# Patient Record
Sex: FEMALE | Race: WHITE | URBAN - METROPOLITAN AREA
[De-identification: names, ages, dates, MRNs, and addresses within clinical notes are randomized per-mention and may not be internally consistent; named-entity substitution may affect disease eponyms.]

---

## 2017-08-10 ENCOUNTER — OUTPATIENT (OUTPATIENT)
Dept: OUTPATIENT SERVICES | Facility: HOSPITAL | Age: 32
LOS: 1 days | End: 2017-08-10
Payer: OTHER GOVERNMENT

## 2017-08-10 PROCEDURE — 76801 OB US < 14 WKS SINGLE FETUS: CPT | Mod: 26

## 2017-09-22 ENCOUNTER — EMERGENCY (EMERGENCY)
Facility: HOSPITAL | Age: 32
LOS: 1 days | End: 2017-09-22
Payer: OTHER GOVERNMENT

## 2017-09-22 PROCEDURE — 99285 EMERGENCY DEPT VISIT HI MDM: CPT

## 2017-09-22 PROCEDURE — 76815 OB US LIMITED FETUS(S): CPT | Mod: 26

## 2018-02-04 ENCOUNTER — INPATIENT (INPATIENT)
Facility: HOSPITAL | Age: 33
LOS: 2 days | Discharge: HOME | End: 2018-02-07
Attending: OBSTETRICS & GYNECOLOGY | Admitting: OBSTETRICS & GYNECOLOGY
Payer: MEDICAID

## 2018-02-04 VITALS
DIASTOLIC BLOOD PRESSURE: 77 MMHG | TEMPERATURE: 98 F | SYSTOLIC BLOOD PRESSURE: 135 MMHG | RESPIRATION RATE: 20 BRPM | HEART RATE: 77 BPM | OXYGEN SATURATION: 100 %

## 2018-02-04 DIAGNOSIS — O47.1 FALSE LABOR AT OR AFTER 37 COMPLETED WEEKS OF GESTATION: ICD-10-CM

## 2018-02-04 LAB
ABO RH CONFIRMATION: SIGNIFICANT CHANGE UP
AMPHET UR-MCNC: NEGATIVE — SIGNIFICANT CHANGE UP
APPEARANCE UR: ABNORMAL
BACTERIA # UR AUTO: ABNORMAL
BARBITURATES UR SCN-MCNC: NEGATIVE — SIGNIFICANT CHANGE UP
BASE EXCESS BLDCOV CALC-SCNC: -5.8 MMOL/L — LOW (ref -2–2)
BASOPHILS # BLD AUTO: 0 K/UL — SIGNIFICANT CHANGE UP (ref 0–0.2)
BENZODIAZ UR-MCNC: NEGATIVE — SIGNIFICANT CHANGE UP
BILIRUB UR-MCNC: NEGATIVE — SIGNIFICANT CHANGE UP
BLD GP AB SCN SERPL QL: SIGNIFICANT CHANGE UP
COCAINE METAB.OTHER UR-MCNC: POSITIVE
COLOR SPEC: YELLOW — SIGNIFICANT CHANGE UP
DIFF PNL FLD: ABNORMAL
EOSINOPHIL # BLD AUTO: 0 K/UL — SIGNIFICANT CHANGE UP (ref 0–0.5)
EPI CELLS # UR: SIGNIFICANT CHANGE UP
GAS PNL BLDCOV: 7.29 — SIGNIFICANT CHANGE UP (ref 7.25–7.45)
GLUCOSE UR QL: 50 MG/DL
HBV SURFACE AG SERPL QL IA: SIGNIFICANT CHANGE UP
HCO3 BLDCOV-SCNC: 19 MMOL/L — LOW (ref 21–29)
HCT VFR BLD CALC: 38.3 % — SIGNIFICANT CHANGE UP (ref 37–47)
HGB BLD-MCNC: 13.5 G/DL — SIGNIFICANT CHANGE UP (ref 12–16)
HIV 1 & 2 AB SERPL IA.RAPID: SIGNIFICANT CHANGE UP
KETONES UR-MCNC: ABNORMAL
LEUKOCYTE ESTERASE UR-ACNC: ABNORMAL
LYMPHOCYTES # BLD AUTO: 1 K/UL — SIGNIFICANT CHANGE UP (ref 1–4.8)
LYMPHOCYTES # BLD AUTO: 3 % — LOW (ref 20–55)
MCHC RBC-ENTMCNC: 31.3 PG — HIGH (ref 27–31)
MCHC RBC-ENTMCNC: 35.2 G/DL — SIGNIFICANT CHANGE UP (ref 32–36)
MCV RBC AUTO: 88.9 FL — SIGNIFICANT CHANGE UP (ref 81–99)
METHADONE UR-MCNC: NEGATIVE — SIGNIFICANT CHANGE UP
MONOCYTES # BLD AUTO: 0.9 K/UL — HIGH (ref 0–0.8)
MONOCYTES NFR BLD AUTO: 2 % — LOW (ref 3–10)
NEUTROPHILS # BLD AUTO: 35 K/UL — HIGH (ref 1.8–8)
NEUTROPHILS NFR BLD AUTO: 80 % — HIGH (ref 37–73)
NEUTS BAND # BLD: 15 % — HIGH (ref 0–8)
NITRITE UR-MCNC: POSITIVE
OPIATES UR-MCNC: POSITIVE
PCO2 BLDCOV: 43.1 MMHG — SIGNIFICANT CHANGE UP (ref 29–53)
PCP SPEC-MCNC: SIGNIFICANT CHANGE UP
PCP UR-MCNC: NEGATIVE — SIGNIFICANT CHANGE UP
PH UR: 6 — SIGNIFICANT CHANGE UP (ref 5–8)
PLAT MORPH BLD: NORMAL — SIGNIFICANT CHANGE UP
PLATELET # BLD AUTO: 295 K/UL — SIGNIFICANT CHANGE UP (ref 150–400)
PO2 BLDCOA: 25.1 MMHG — SIGNIFICANT CHANGE UP (ref 17–41)
PROT UR-MCNC: 100 MG/DL
RBC # BLD: 4.31 M/UL — LOW (ref 4.4–5.2)
RBC # FLD: 13.6 % — SIGNIFICANT CHANGE UP (ref 11–15.6)
RBC BLD AUTO: NORMAL — SIGNIFICANT CHANGE UP
RBC CASTS # UR COMP ASSIST: ABNORMAL /HPF (ref 0–4)
SAO2 % BLDCOV: SIGNIFICANT CHANGE UP
SP GR SPEC: 1.02 — SIGNIFICANT CHANGE UP (ref 1.01–1.02)
THC UR QL: POSITIVE
TYPE + AB SCN PNL BLD: SIGNIFICANT CHANGE UP
UROBILINOGEN FLD QL: 1 MG/DL
WBC # BLD: 37 K/UL — HIGH (ref 4.8–10.8)
WBC # FLD AUTO: 37 K/UL — HIGH (ref 4.8–10.8)
WBC UR QL: >50

## 2018-02-04 PROCEDURE — 88307 TISSUE EXAM BY PATHOLOGIST: CPT | Mod: 26

## 2018-02-04 RX ORDER — FERROUS SULFATE 325(65) MG
325 TABLET ORAL DAILY
Refills: 0 | Status: DISCONTINUED | OUTPATIENT
Start: 2018-02-04 | End: 2018-02-07

## 2018-02-04 RX ORDER — KETOROLAC TROMETHAMINE 30 MG/ML
30 SYRINGE (ML) INJECTION ONCE
Refills: 0 | Status: DISCONTINUED | OUTPATIENT
Start: 2018-02-04 | End: 2018-02-04

## 2018-02-04 RX ORDER — SODIUM CHLORIDE 9 MG/ML
1000 INJECTION, SOLUTION INTRAVENOUS
Refills: 0 | Status: DISCONTINUED | OUTPATIENT
Start: 2018-02-04 | End: 2018-02-07

## 2018-02-04 RX ORDER — OXYTOCIN 10 UNIT/ML
333.33 VIAL (ML) INJECTION
Qty: 20 | Refills: 0 | Status: COMPLETED | OUTPATIENT
Start: 2018-02-04 | End: 2018-02-04

## 2018-02-04 RX ORDER — CITRIC ACID/SODIUM CITRATE 300-500 MG
30 SOLUTION, ORAL ORAL ONCE
Refills: 0 | Status: DISCONTINUED | OUTPATIENT
Start: 2018-02-04 | End: 2018-02-07

## 2018-02-04 RX ORDER — ONDANSETRON 8 MG/1
4 TABLET, FILM COATED ORAL ONCE
Refills: 0 | Status: DISCONTINUED | OUTPATIENT
Start: 2018-02-04 | End: 2018-02-04

## 2018-02-04 RX ORDER — AMPICILLIN TRIHYDRATE 250 MG
CAPSULE ORAL
Refills: 0 | Status: DISCONTINUED | OUTPATIENT
Start: 2018-02-04 | End: 2018-02-07

## 2018-02-04 RX ORDER — TETANUS TOXOID, REDUCED DIPHTHERIA TOXOID AND ACELLULAR PERTUSSIS VACCINE, ADSORBED 5; 2.5; 8; 8; 2.5 [IU]/.5ML; [IU]/.5ML; UG/.5ML; UG/.5ML; UG/.5ML
0.5 SUSPENSION INTRAMUSCULAR ONCE
Refills: 0 | Status: DISCONTINUED | OUTPATIENT
Start: 2018-02-04 | End: 2018-02-07

## 2018-02-04 RX ORDER — AMPICILLIN TRIHYDRATE 250 MG
2 CAPSULE ORAL ONCE
Refills: 0 | Status: COMPLETED | OUTPATIENT
Start: 2018-02-04 | End: 2018-02-04

## 2018-02-04 RX ORDER — OXYTOCIN 10 UNIT/ML
41.67 VIAL (ML) INJECTION
Qty: 20 | Refills: 0 | Status: DISCONTINUED | OUTPATIENT
Start: 2018-02-04 | End: 2018-02-04

## 2018-02-04 RX ORDER — CEFAZOLIN SODIUM 1 G
2000 VIAL (EA) INJECTION ONCE
Refills: 0 | Status: COMPLETED | OUTPATIENT
Start: 2018-02-04 | End: 2018-02-04

## 2018-02-04 RX ORDER — SODIUM CHLORIDE 9 MG/ML
1000 INJECTION, SOLUTION INTRAVENOUS ONCE
Refills: 0 | Status: COMPLETED | OUTPATIENT
Start: 2018-02-04 | End: 2018-02-04

## 2018-02-04 RX ORDER — LANOLIN
1 OINTMENT (GRAM) TOPICAL
Refills: 0 | Status: DISCONTINUED | OUTPATIENT
Start: 2018-02-04 | End: 2018-02-07

## 2018-02-04 RX ORDER — SODIUM CHLORIDE 9 MG/ML
1000 INJECTION, SOLUTION INTRAVENOUS ONCE
Refills: 0 | Status: DISCONTINUED | OUTPATIENT
Start: 2018-02-04 | End: 2018-02-07

## 2018-02-04 RX ORDER — DOCUSATE SODIUM 100 MG
100 CAPSULE ORAL
Refills: 0 | Status: DISCONTINUED | OUTPATIENT
Start: 2018-02-04 | End: 2018-02-07

## 2018-02-04 RX ORDER — HYDROMORPHONE HYDROCHLORIDE 2 MG/ML
30 INJECTION INTRAMUSCULAR; INTRAVENOUS; SUBCUTANEOUS
Refills: 0 | Status: DISCONTINUED | OUTPATIENT
Start: 2018-02-04 | End: 2018-02-05

## 2018-02-04 RX ORDER — LABETALOL HCL 100 MG
20 TABLET ORAL ONCE
Refills: 0 | Status: COMPLETED | OUTPATIENT
Start: 2018-02-04 | End: 2018-02-04

## 2018-02-04 RX ORDER — SODIUM CHLORIDE 9 MG/ML
1000 INJECTION, SOLUTION INTRAVENOUS
Refills: 0 | Status: DISCONTINUED | OUTPATIENT
Start: 2018-02-04 | End: 2018-02-04

## 2018-02-04 RX ORDER — SIMETHICONE 80 MG/1
80 TABLET, CHEWABLE ORAL EVERY 4 HOURS
Refills: 0 | Status: DISCONTINUED | OUTPATIENT
Start: 2018-02-04 | End: 2018-02-07

## 2018-02-04 RX ORDER — OXYCODONE AND ACETAMINOPHEN 5; 325 MG/1; MG/1
1 TABLET ORAL
Refills: 0 | Status: DISCONTINUED | OUTPATIENT
Start: 2018-02-04 | End: 2018-02-05

## 2018-02-04 RX ORDER — GLYCERIN ADULT
1 SUPPOSITORY, RECTAL RECTAL AT BEDTIME
Refills: 0 | Status: DISCONTINUED | OUTPATIENT
Start: 2018-02-04 | End: 2018-02-07

## 2018-02-04 RX ORDER — METOCLOPRAMIDE HCL 10 MG
10 TABLET ORAL ONCE
Refills: 0 | Status: DISCONTINUED | OUTPATIENT
Start: 2018-02-04 | End: 2018-02-07

## 2018-02-04 RX ORDER — ACETAMINOPHEN 500 MG
650 TABLET ORAL EVERY 6 HOURS
Refills: 0 | Status: DISCONTINUED | OUTPATIENT
Start: 2018-02-04 | End: 2018-02-07

## 2018-02-04 RX ORDER — OXYTOCIN 10 UNIT/ML
41.67 VIAL (ML) INJECTION
Qty: 20 | Refills: 0 | Status: DISCONTINUED | OUTPATIENT
Start: 2018-02-04 | End: 2018-02-07

## 2018-02-04 RX ORDER — DIPHENHYDRAMINE HCL 50 MG
25 CAPSULE ORAL EVERY 6 HOURS
Refills: 0 | Status: DISCONTINUED | OUTPATIENT
Start: 2018-02-04 | End: 2018-02-07

## 2018-02-04 RX ORDER — AMPICILLIN TRIHYDRATE 250 MG
2 CAPSULE ORAL EVERY 6 HOURS
Refills: 0 | Status: DISCONTINUED | OUTPATIENT
Start: 2018-02-05 | End: 2018-02-07

## 2018-02-04 RX ORDER — OXYCODONE AND ACETAMINOPHEN 5; 325 MG/1; MG/1
2 TABLET ORAL EVERY 6 HOURS
Refills: 0 | Status: DISCONTINUED | OUTPATIENT
Start: 2018-02-04 | End: 2018-02-05

## 2018-02-04 RX ADMIN — Medication 100 MILLIGRAM(S): at 16:00

## 2018-02-04 RX ADMIN — Medication 216 GRAM(S): at 22:00

## 2018-02-04 RX ADMIN — Medication 125 MILLIUNIT(S)/MIN: at 19:46

## 2018-02-04 RX ADMIN — Medication 1000 MILLIUNIT(S)/MIN: at 19:43

## 2018-02-04 RX ADMIN — SODIUM CHLORIDE 2000 MILLILITER(S): 9 INJECTION, SOLUTION INTRAVENOUS at 19:25

## 2018-02-04 RX ADMIN — Medication 20 MILLIGRAM(S): at 18:02

## 2018-02-04 NOTE — CHART NOTE - NSCHARTNOTEFT_GEN_A_CORE
Hola 18:00phone call from OBGyn ([place]oda Gama) to inform worker about pt's arrival condition . Pt was found by her partner on the floor lethargic as per pt's boyfriend first trimester of pregnancy,as per EMS first pregnancy for pt. Pt end up having a Csection due to fetal distress ,as per baby's appearance around 34weeks gestational stage. Baby in the NICU, in withdrawals. Pt's lab results positive for cocaine,opioids.   21:20 Worker met with pt however, pt nonverbal at this moment. Worker spoke with pt's nurse( Sandy) states that pt did not talk to her either. Pt does not have an ID on her ,as per EMS her name is Molly Penn from [facility], unable to verify her personal info. Worker can not proceed with CPS referral until reliable info is obtained. SW to follow in the am. oHla 18:00phone call from OBGyn (Rhoda Gama) to inform worker about pt's arrival condition . Pt was found by her partner on the floor lethargic as per pt's boyfriend first trimester of pregnancy, as per EMS first pregnancy for pt. Pt end up having a Csection due to fetal distress ,as per baby's appearance around 34weeks gestational stage. Baby in the NICU, in withdrawals. Pt's lab results positive for cocaine, opiates and marihuana.   21:20 Worker met with pt however, pt nonverbal at this moment. Worker spoke with pt's nurse (Sandy) states that pt did not talk to her either. Pt does not have an ID on her ,as per EMS her name is Molly Penn from [facility], unable to verify her personal info. Worker can not proceed with CPS referral until reliable info is obtained. SW to follow in the am.

## 2018-02-05 DIAGNOSIS — R26.9 UNSPECIFIED ABNORMALITIES OF GAIT AND MOBILITY: ICD-10-CM

## 2018-02-05 DIAGNOSIS — R10.2 PELVIC AND PERINEAL PAIN: ICD-10-CM

## 2018-02-05 DIAGNOSIS — M62.81 MUSCLE WEAKNESS (GENERALIZED): ICD-10-CM

## 2018-02-05 DIAGNOSIS — F19.90 OTHER PSYCHOACTIVE SUBSTANCE USE, UNSPECIFIED, UNCOMPLICATED: ICD-10-CM

## 2018-02-05 DIAGNOSIS — F19.10 OTHER PSYCHOACTIVE SUBSTANCE ABUSE, UNCOMPLICATED: ICD-10-CM

## 2018-02-05 LAB
BASOPHILS # BLD AUTO: 0 K/UL — SIGNIFICANT CHANGE UP (ref 0–0.2)
BASOPHILS NFR BLD AUTO: 0.1 % — SIGNIFICANT CHANGE UP (ref 0–2)
EOSINOPHIL # BLD AUTO: 0 K/UL — SIGNIFICANT CHANGE UP (ref 0–0.5)
EOSINOPHIL NFR BLD AUTO: 0 % — SIGNIFICANT CHANGE UP (ref 0–5)
HCT VFR BLD CALC: 28.1 % — LOW (ref 37–47)
HGB BLD-MCNC: 9.4 G/DL — LOW (ref 12–16)
LYMPHOCYTES # BLD AUTO: 1.5 K/UL — SIGNIFICANT CHANGE UP (ref 1–4.8)
LYMPHOCYTES # BLD AUTO: 6.3 % — LOW (ref 20–55)
MCHC RBC-ENTMCNC: 30.6 PG — SIGNIFICANT CHANGE UP (ref 27–31)
MCHC RBC-ENTMCNC: 33.5 G/DL — SIGNIFICANT CHANGE UP (ref 32–36)
MCV RBC AUTO: 91.5 FL — SIGNIFICANT CHANGE UP (ref 81–99)
MONOCYTES # BLD AUTO: 1.1 K/UL — HIGH (ref 0–0.8)
MONOCYTES NFR BLD AUTO: 4.6 % — SIGNIFICANT CHANGE UP (ref 3–10)
NEUTROPHILS # BLD AUTO: 21.4 K/UL — HIGH (ref 1.8–8)
NEUTROPHILS NFR BLD AUTO: 88.6 % — HIGH (ref 37–73)
PLATELET # BLD AUTO: 261 K/UL — SIGNIFICANT CHANGE UP (ref 150–400)
RBC # BLD: 3.07 M/UL — LOW (ref 4.4–5.2)
RBC # FLD: 13.8 % — SIGNIFICANT CHANGE UP (ref 11–15.6)
RUBV IGG SER-ACNC: 16.2 INDEX — SIGNIFICANT CHANGE UP
RUBV IGG SER-IMP: POSITIVE — SIGNIFICANT CHANGE UP
T PALLIDUM AB TITR SER: NEGATIVE — SIGNIFICANT CHANGE UP
WBC # BLD: 24.2 K/UL — HIGH (ref 4.8–10.8)
WBC # FLD AUTO: 24.2 K/UL — HIGH (ref 4.8–10.8)

## 2018-02-05 RX ORDER — ACETAMINOPHEN 500 MG
975 TABLET ORAL EVERY 8 HOURS
Refills: 0 | Status: DISCONTINUED | OUTPATIENT
Start: 2018-02-05 | End: 2018-02-07

## 2018-02-05 RX ORDER — CYCLOBENZAPRINE HYDROCHLORIDE 10 MG/1
5 TABLET, FILM COATED ORAL THREE TIMES A DAY
Refills: 0 | Status: DISCONTINUED | OUTPATIENT
Start: 2018-02-05 | End: 2018-02-07

## 2018-02-05 RX ORDER — IBUPROFEN 200 MG
600 TABLET ORAL
Refills: 0 | Status: DISCONTINUED | OUTPATIENT
Start: 2018-02-05 | End: 2018-02-07

## 2018-02-05 RX ADMIN — HYDROMORPHONE HYDROCHLORIDE 30 MILLILITER(S): 2 INJECTION INTRAMUSCULAR; INTRAVENOUS; SUBCUTANEOUS at 07:13

## 2018-02-05 RX ADMIN — Medication 975 MILLIGRAM(S): at 14:19

## 2018-02-05 RX ADMIN — Medication 600 MILLIGRAM(S): at 11:35

## 2018-02-05 RX ADMIN — Medication 600 MILLIGRAM(S): at 18:45

## 2018-02-05 RX ADMIN — Medication 216 GRAM(S): at 09:34

## 2018-02-05 RX ADMIN — CYCLOBENZAPRINE HYDROCHLORIDE 5 MILLIGRAM(S): 10 TABLET, FILM COATED ORAL at 14:20

## 2018-02-05 RX ADMIN — Medication 600 MILLIGRAM(S): at 12:30

## 2018-02-05 RX ADMIN — Medication 975 MILLIGRAM(S): at 22:12

## 2018-02-05 RX ADMIN — Medication 1 TABLET(S): at 14:18

## 2018-02-05 RX ADMIN — Medication 975 MILLIGRAM(S): at 15:00

## 2018-02-05 RX ADMIN — Medication 600 MILLIGRAM(S): at 17:48

## 2018-02-05 RX ADMIN — Medication 216 GRAM(S): at 22:14

## 2018-02-05 RX ADMIN — Medication 325 MILLIGRAM(S): at 14:18

## 2018-02-05 RX ADMIN — CYCLOBENZAPRINE HYDROCHLORIDE 5 MILLIGRAM(S): 10 TABLET, FILM COATED ORAL at 22:13

## 2018-02-05 RX ADMIN — SODIUM CHLORIDE 125 MILLILITER(S): 9 INJECTION, SOLUTION INTRAVENOUS at 17:20

## 2018-02-05 RX ADMIN — SODIUM CHLORIDE 125 MILLILITER(S): 9 INJECTION, SOLUTION INTRAVENOUS at 09:11

## 2018-02-05 RX ADMIN — Medication 216 GRAM(S): at 17:19

## 2018-02-05 RX ADMIN — Medication 216 GRAM(S): at 04:10

## 2018-02-05 RX ADMIN — Medication 975 MILLIGRAM(S): at 23:00

## 2018-02-05 NOTE — DISCHARGE NOTE OB - CARE PROVIDER_API CALL
[facility],   [address] [place] [address]  [phone]  Phone: [phone]  Fax: (       -

## 2018-02-05 NOTE — DISCHARGE NOTE OB - PATIENT PORTAL LINK FT
You can access the [facility] Patient Portal, offered by [facility], by registering with the following website: [email or web address]

## 2018-02-05 NOTE — DISCHARGE NOTE OB - MATERIALS PROVIDED
[facility] [place] Screening Program/Bottle Feeding Log/Guide to Postpartum Care/[facility] Hearing Screen Program/Back To Sleep Handout/Shaken Baby Prevention Handout/Birth Certificate Instructions/Tdap Vaccination (VIS Pub Date: 2012)

## 2018-02-05 NOTE — DISCHARGE NOTE OB - PLAN OF CARE
Please use your referral and make an appointment for [facility] at [place] for mental health and substance use counseling. Follow up in 5 days at your obstetrician (or the OB listed below) for incision evaluation (+staple removal), and to follow up with the obstetrician in 6 weeks for postpartum care. Delivered

## 2018-02-05 NOTE — CONSULT NOTE ADULT - ATTENDING COMMENTS
PLAN-  #Controlled Substances:  - recommend d/c Dilaudid IV PCA  - recommend d/c Percocet   - Please monitor closely for oversedation and respiratory depression     #Adjuvant Analgesics:  - recommend starting acetaminophen 975mg PO q8h standing if LFTs are WNL  - recommend starting Ibuprofen 600mg PO q6h standing if renal function WNL  - recommend starting flexeril 5mg PO TID x5 days     #Bowel Regimen:  - per primary team as clinically indicated       Advise caution if primary team elects to continue with opioid analgesia given evidence of cocaine in recent UTox.   Recommend social work consultation for consideration of inpatient vs outpatient drug rehab/detox.   Please page if any concerns: [phone].

## 2018-02-05 NOTE — DISCHARGE NOTE OB - COMMUNITY RESOURCES
D/C plan is home. [place] to remain in NBSN until medically stable.  Community, Maternity and substance Abuse Resource lists. [facility] appt made for 2108 @ 11:30 with elisabeth Kennedy. Address [address] [place] [address] [phone]. N to follow  upon D/C.

## 2018-02-05 NOTE — DISCHARGE NOTE OB - CARE PLAN
Principal Discharge DX:	 delivery delivered  Goal:	Delivered  Assessment and plan of treatment:	Follow up in 5 days at your obstetrician (or the OB listed below) for incision evaluation (+staple removal), and to follow up with the obstetrician in 6 weeks for postpartum care.  Secondary Diagnosis:	Substance abuse  Assessment and plan of treatment:	Please use your referral and make an appointment for [facility] at [place] for mental health and substance use counseling.

## 2018-02-05 NOTE — PROGRESS NOTE ADULT - ASSESSMENT
32y  s/p emergent  due to fetal distress at unknown gestational age PPD#1. Patient is poor historian with history of heavy drug abuse. Utox positive for cocaine, opiates and THC. Patient is currently on PCA pump for pain control. Pain management consult pending for pain control recommendations given hx of opiate abuse. Social work consult pending.

## 2018-02-05 NOTE — CONSULT NOTE ADULT - SUBJECTIVE AND OBJECTIVE BOX
Chief Complaint: postoperative pelvic pain       HPI:   CHELSEA KEY is a 32y Female that was seen and examined at bedside laying comfortably supine and noted to be sedated. Patient with difficulty keeping eyes open throughout encounter but noted to report 10/10 pain localized to the pelvic region. Patient noted to be poor historian and denies any past history of smoking, alcohol, or illicit drug use, which remains contrary to obtained UTox results + for THC, opioids, cocaine. Nursing reports patient has not taken anything PO as of yet. Nursing reports patient was able to ambulate to bathroom with moderate assistance. Noted 6/6 bolusing over last 4hr documented period. Patient offers no further complaints.       REVIEW OF SYSTEMS: X denotes positive finding, otherwise all additional items were reviewed and negative  GEN: [] fever, [] chills, [] change in appetite, [] weight loss, [] fatigue, [x] sedation  ENT: [] change in vision, [] dry mouth, [] ringing in ears, [] sore throat  RESP: [] shortness of breath, [] Obstructive sleep apnea  CV: [] chest pain, [] palpitations   GI: [] nausea, [] vomiting, [] flatus, [] recent BM, [] constipation, [] GERD  : [] bladder dysfunction, [] dysuria  MSK: [] weakness, [] joint pain, [] myalgias  Neuro: [x] pain, [] numbness, [] tingling, [] tremor, [] seizures  Skin: [] rash, [] pruritis  Psych: [] anxiety, [] depression  Endo: [] polydipsia  Heme: [] coagulopathic disorder      Home Medications:  none provided    PAST MEDICAL & SURGICAL HISTORY:  +illicit drug use   s/p     SOCIAL HISTORY:  Denies Smoking, Alcohol, or Drug Use  FAMILY HISTORY:  unable to obtain       Allergies  Allergy Status Unknown      PAIN MEDICATIONS:  MEDICATIONS  (STANDING):  ampicillin  IVPB      ampicillin  IVPB 2 Gram(s) IV Intermittent every 6 hours  citric acid/sodium citrate Solution 30 milliLiter(s) Oral once  diphtheria/tetanus/pertussis (acellular) Vaccine (ADAcel) 0.5 milliLiter(s) IntraMuscular once  ferrous    sulfate 325 milliGRAM(s) Oral daily  HYDROmorphone PCA (1 mG/mL) 30 milliLiter(s) PCA Continuous PCA Continuous  lactated ringers Bolus 1000 milliLiter(s) IV Bolus once  lactated ringers. 1000 milliLiter(s) (125 mL/Hr) IV Continuous <Continuous>  lactated ringers. 1000 milliLiter(s) (125 mL/Hr) IV Continuous <Continuous>  oxytocin Infusion 41.667 milliUNIT(s)/Min (125 mL/Hr) IV Continuous <Continuous>  prenatal multivitamin 1 Tablet(s) Oral daily    MEDICATIONS  (PRN):  acetaminophen   Tablet 650 milliGRAM(s) Oral every 6 hours PRN For Temp greater than 38.5 C (101.3 F)  diphenhydrAMINE   Capsule 25 milliGRAM(s) Oral every 6 hours PRN Itching  docusate sodium 100 milliGRAM(s) Oral two times a day PRN Stool Softening  glycerin Suppository - Adult 1 Suppository(s) Rectal at bedtime PRN Constipation  lanolin Ointment 1 Application(s) Topical every 3 hours PRN Sore Nipples  metoclopramide Injectable 10 milliGRAM(s) IV Push once PRN Nausea and/or Vomiting  oxyCODONE    5 mG/acetaminophen 325 mG 1 Tablet(s) Oral every 3 hours PRN Moderate Pain  oxyCODONE    5 mG/acetaminophen 325 mG 2 Tablet(s) Oral every 6 hours PRN Severe Pain  simethicone 80 milliGRAM(s) Chew every 4 hours PRN Gas      Vital Signs Last 24 Hrs  T(C): 36.8 (2018 07:57), Max: 37.5 (2018 21:13)  T(F): 98.3 (2018 07:57), Max: 99.5 (2018 21:13)  HR: 90 (2018 07:57) (61 - 111)  BP: 134/81 (2018 07:57) (110/60 - 162/70)  BP(mean): --  RR: 20 (2018 07:57) (15 - 26)  SpO2: 99% (2018 19:05) (98% - 100%)             PHYSICAL EXAM: X denotes positive finding   GENERAL: [] cooperative, [] uncooperative, [x] NAD, [] in distress, [] speech clear and coherent  HEENT: [x] NCAT, [] EOMI, [] pupils non pinpoint, [x] no external deformities, [] NGT   NECK: [] normal overall appearance, [x] no gross masses  RESPIRATORY: [x] unlabored respirations, [x] on room air, [] on supplemental O2, [] labored respirations  CV: [x] regular rate, [] regular rhythm, [] no LE edema, [] LE edema  Abdomen: [] soft, [x] non-tender, [] tender to palpation, [] pos bowel sounds  : [] knox, [x] deferred  Skin: [] normal texture, [] rash, [x] lesion(s), [] drain(s)   MSK: [x] limited AROM, [] extremities antigravity x4, [] normal gait  Neuro: [x] SILT, [] sensation reduced to light touch, [] abnormal DTRs  Psych: [] oriented to person, place, time, [] displays insight, [x] limited/impaired insight, [] poor coping skills       LABS:                        9.4    24.2  )-----------( 261      ( 2018 07:06 )             28.1     Urinalysis Basic - ( 2018 15:50 )    Color: Yellow / Appearance: Slightly Turbid / S.025 / pH: x  Gluc: x / Ketone: Large  / Bili: Negative / Urobili: 1 mg/dL   Blood: x / Protein: 100 mg/dL / Nitrite: Positive   Leuk Esterase: Moderate / RBC: 11-25 /HPF / WBC >50   Sq Epi: x / Non Sq Epi: Few / Bacteria: Moderate      Drug Screen:  Drug Screen W/PCP, Urine: Done ( @ 16:01)      Radiology:      :  unable to obtain as no name provided

## 2018-02-05 NOTE — DISCHARGE NOTE OB - PROVIDER TOKENS
FREE:[LAST:[[facility]],PHONE:[phone]],FAX:[(   )    -],ADDRESS:[[place] [address]  [phone]]]

## 2018-02-05 NOTE — PROGRESS NOTE ADULT - SUBJECTIVE AND OBJECTIVE BOX
S/P  with General anesthesia POD#1  A&Ox3, VSS, No complaints.  Denies any PONV or sore throat.  Pain well controlled.  No apparent anesthesia complications noted.

## 2018-02-05 NOTE — DISCHARGE NOTE OB - MEDICATION SUMMARY - MEDICATIONS TO TAKE
I will START or STAY ON the medications listed below when I get home from the hospital:    ibuprofen 600 mg oral tablet  -- 1 tab(s) by mouth every 6 hours   -- Indication: For Pain    FeroSul 325 mg (65 mg elemental iron) oral tablet  -- 1 tab(s) by mouth once a day   -- Check with your doctor before becoming pregnant.  Do not chew, break, or crush.  May discolor urine or feces.    -- Indication: For Anemia    Prenatal Multivitamins with Folic Acid 1 mg oral tablet  -- 1 tab(s) by mouth once a day  -- Indication: For Preventive measure    docusate sodium 100 mg oral capsule  -- 1 cap(s) by mouth 2 times a day, As needed, Stool Softening  -- Indication: For Constipation    ascorbic acid 500 mg oral tablet  -- 1 tab(s) by mouth once a day   -- Indication: For Preventive measure

## 2018-02-05 NOTE — CONSULT NOTE ADULT - ASSESSMENT
CHELSEA KEY is a 32y Female who presents with pain in the pelvic region which is nociceptive in nature due to s/p .  Pain is intermittently uncontrolled with current regimen.     Co-morbid Conditions:  False labor after 37 completed weeks of gestation  Drug abuse   delivery delivered  FALSE LABOR AT OR AFTER 37 COM

## 2018-02-05 NOTE — DISCHARGE NOTE OB - HOSPITAL COURSE
32y  s/p emergent  due to fetal distress at unknown gestational age. Patient is poor historian and is not cooperative with history of heavy drug abuse. Utox positive for cocaine, opiates and THC.  PCA pump was discontinued. Bedside sono showed distended bladder, patient was able to void afterwards (900 mL). Psych consult unable to assess as patient refused to see psychiatrist. However, psych consult stated that she was unlikely to be withdrawing as he does not have symptoms as per RN. She was given a referral for [facility] for mental health and substance abuse.

## 2018-02-05 NOTE — DISCHARGE NOTE OB - ADDITIONAL INSTRUCTIONS
Follow up in 5 days at your obstetrician (or the OB listed below) for incision evaluation (+staple removal), and to follow up with the obstetrician in 6 weeks for postpartum care.

## 2018-02-05 NOTE — PROGRESS NOTE ADULT - SUBJECTIVE AND OBJECTIVE BOX
32y  s/p emergent  due to fetal distress at unknown gestational age PPD#1.   Patient seen and examined at bedside, no acute overnight events.   Patient is ambulating, +eating, +PO hydration, +voiding, +Flatus, -BM, +Formula feeding only, and pain is well controlled. Patient denies any vaginal bleeding.   ROS: Denies headache, SOB, fever, chills and calf pain.    VS:   Vital Signs Last 24 Hrs  T(C): 37.2 (2018 03:44), Max: 37.5 (2018 21:13)  T(F): 99 (2018 03:44), Max: 99.5 (2018 21:13)  HR: 111 (2018 03:44) (61 - 111)  BP: 110/60 (2018 03:44) (110/60 - 162/70)  BP(mean): --  RR: 22 (2018 03:44) (15 - 26)  SpO2: 99% (2018 19:05) (98% - 100%)    Physical Exam:  General: NAD  CVS: RRR, +S1/S2  Lungs: CTAB, no wheeze, ronchi or rales.   Breast: No tenderness or abnormal discharge.  Abdomen: +BS, soft, ND, minimally tender, Fundus firm at 2cm above level of umbilicus. Incision site is clean, dry with no bleeding or discharge noted.   Pelvic: Minimal lochia  Ext: No cyanosis, edema or calf tenderness.     Labs:                        13.5   37.0  )-----------( 295      ( 2018 15:42 )             38.3     Urinalysis Basic - ( 2018 15:50 )    Color: Yellow / Appearance: Slightly Turbid / S.025 / pH: x  Gluc: x / Ketone: Large  / Bili: Negative / Urobili: 1 mg/dL   Blood: x / Protein: 100 mg/dL / Nitrite: Positive   Leuk Esterase: Moderate / RBC: 11-25 /HPF / WBC >50   Sq Epi: x / Non Sq Epi: Few / Bacteria: Moderate        Medication:  MEDICATIONS  (STANDING):  ampicillin  IVPB      ampicillin  IVPB 2 Gram(s) IV Intermittent every 6 hours  citric acid/sodium citrate Solution 30 milliLiter(s) Oral once  diphtheria/tetanus/pertussis (acellular) Vaccine (ADAcel) 0.5 milliLiter(s) IntraMuscular once  ferrous    sulfate 325 milliGRAM(s) Oral daily  HYDROmorphone PCA (1 mG/mL) 30 milliLiter(s) PCA Continuous PCA Continuous  lactated ringers Bolus 1000 milliLiter(s) IV Bolus once  lactated ringers. 1000 milliLiter(s) (125 mL/Hr) IV Continuous <Continuous>  lactated ringers. 1000 milliLiter(s) (125 mL/Hr) IV Continuous <Continuous>  oxytocin Infusion 41.667 milliUNIT(s)/Min (125 mL/Hr) IV Continuous <Continuous>  prenatal multivitamin 1 Tablet(s) Oral daily    MEDICATIONS  (PRN):  acetaminophen   Tablet 650 milliGRAM(s) Oral every 6 hours PRN For Temp greater than 38.5 C (101.3 F)  diphenhydrAMINE   Capsule 25 milliGRAM(s) Oral every 6 hours PRN Itching  docusate sodium 100 milliGRAM(s) Oral two times a day PRN Stool Softening  glycerin Suppository - Adult 1 Suppository(s) Rectal at bedtime PRN Constipation  lanolin Ointment 1 Application(s) Topical every 3 hours PRN Sore Nipples  metoclopramide Injectable 10 milliGRAM(s) IV Push once PRN Nausea and/or Vomiting  oxyCODONE    5 mG/acetaminophen 325 mG 1 Tablet(s) Oral every 3 hours PRN Moderate Pain  oxyCODONE    5 mG/acetaminophen 325 mG 2 Tablet(s) Oral every 6 hours PRN Severe Pain  simethicone 80 milliGRAM(s) Chew every 4 hours PRN Gas 32y  s/p emergent  due to fetal distress at unknown gestational age PPD#1.   Patient seen and examined at bedside, no acute overnight events.   Patient is not ambulating,  not eating,  not PO hydration as per RN. She is refusing to provide history. Patient is uncooperative.     VS:   Vital Signs Last 24 Hrs  T(C): 37.2 (2018 03:44), Max: 37.5 (2018 21:13)  T(F): 99 (2018 03:44), Max: 99.5 (2018 21:13)  HR: 111 (2018 03:44) (61 - 111)  BP: 110/60 (2018 03:44) (110/60 - 162/70)  RR: 22 (2018 03:44) (15 - 26)  SpO2: 99% (2018 19:05) (98% - 100%)    Physical Exam:  General NAD  Patient is refusing physical exam.     Labs:                        13.5   37.0  )-----------( 295      ( 2018 15:42 )             38.3     Urinalysis Basic - ( 2018 15:50 )    Color: Yellow / Appearance: Slightly Turbid / S.025 / pH: x  Gluc: x / Ketone: Large  / Bili: Negative / Urobili: 1 mg/dL   Blood: x / Protein: 100 mg/dL / Nitrite: Positive   Leuk Esterase: Moderate / RBC: 11-25 /HPF / WBC >50   Sq Epi: x / Non Sq Epi: Few / Bacteria: Moderate        Medication:  MEDICATIONS  (STANDING):  ampicillin  IVPB      ampicillin  IVPB 2 Gram(s) IV Intermittent every 6 hours  citric acid/sodium citrate Solution 30 milliLiter(s) Oral once  diphtheria/tetanus/pertussis (acellular) Vaccine (ADAcel) 0.5 milliLiter(s) IntraMuscular once  ferrous    sulfate 325 milliGRAM(s) Oral daily  HYDROmorphone PCA (1 mG/mL) 30 milliLiter(s) PCA Continuous PCA Continuous  lactated ringers Bolus 1000 milliLiter(s) IV Bolus once  lactated ringers. 1000 milliLiter(s) (125 mL/Hr) IV Continuous <Continuous>  lactated ringers. 1000 milliLiter(s) (125 mL/Hr) IV Continuous <Continuous>  oxytocin Infusion 41.667 milliUNIT(s)/Min (125 mL/Hr) IV Continuous <Continuous>  prenatal multivitamin 1 Tablet(s) Oral daily    MEDICATIONS  (PRN):  acetaminophen   Tablet 650 milliGRAM(s) Oral every 6 hours PRN For Temp greater than 38.5 C (101.3 F)  diphenhydrAMINE   Capsule 25 milliGRAM(s) Oral every 6 hours PRN Itching  docusate sodium 100 milliGRAM(s) Oral two times a day PRN Stool Softening  glycerin Suppository - Adult 1 Suppository(s) Rectal at bedtime PRN Constipation  lanolin Ointment 1 Application(s) Topical every 3 hours PRN Sore Nipples  metoclopramide Injectable 10 milliGRAM(s) IV Push once PRN Nausea and/or Vomiting  oxyCODONE    5 mG/acetaminophen 325 mG 1 Tablet(s) Oral every 3 hours PRN Moderate Pain  oxyCODONE    5 mG/acetaminophen 325 mG 2 Tablet(s) Oral every 6 hours PRN Severe Pain  simethicone 80 milliGRAM(s) Chew every 4 hours PRN Gas

## 2018-02-06 RX ADMIN — Medication 975 MILLIGRAM(S): at 22:32

## 2018-02-06 RX ADMIN — SODIUM CHLORIDE 125 MILLILITER(S): 9 INJECTION, SOLUTION INTRAVENOUS at 18:48

## 2018-02-06 RX ADMIN — Medication 975 MILLIGRAM(S): at 14:59

## 2018-02-06 RX ADMIN — Medication 216 GRAM(S): at 22:33

## 2018-02-06 RX ADMIN — Medication 600 MILLIGRAM(S): at 13:55

## 2018-02-06 RX ADMIN — CYCLOBENZAPRINE HYDROCHLORIDE 5 MILLIGRAM(S): 10 TABLET, FILM COATED ORAL at 13:06

## 2018-02-06 RX ADMIN — Medication 325 MILLIGRAM(S): at 13:06

## 2018-02-06 RX ADMIN — Medication 216 GRAM(S): at 11:10

## 2018-02-06 RX ADMIN — Medication 975 MILLIGRAM(S): at 23:32

## 2018-02-06 RX ADMIN — CYCLOBENZAPRINE HYDROCHLORIDE 5 MILLIGRAM(S): 10 TABLET, FILM COATED ORAL at 06:06

## 2018-02-06 RX ADMIN — Medication 600 MILLIGRAM(S): at 06:06

## 2018-02-06 RX ADMIN — Medication 975 MILLIGRAM(S): at 15:50

## 2018-02-06 RX ADMIN — Medication 975 MILLIGRAM(S): at 06:07

## 2018-02-06 RX ADMIN — Medication 600 MILLIGRAM(S): at 00:11

## 2018-02-06 RX ADMIN — CYCLOBENZAPRINE HYDROCHLORIDE 5 MILLIGRAM(S): 10 TABLET, FILM COATED ORAL at 22:33

## 2018-02-06 RX ADMIN — Medication 100 MILLIGRAM(S): at 13:06

## 2018-02-06 RX ADMIN — Medication 600 MILLIGRAM(S): at 01:00

## 2018-02-06 RX ADMIN — Medication 600 MILLIGRAM(S): at 07:00

## 2018-02-06 RX ADMIN — Medication 216 GRAM(S): at 16:36

## 2018-02-06 RX ADMIN — Medication 600 MILLIGRAM(S): at 13:06

## 2018-02-06 RX ADMIN — SODIUM CHLORIDE 125 MILLILITER(S): 9 INJECTION, SOLUTION INTRAVENOUS at 11:11

## 2018-02-06 RX ADMIN — Medication 975 MILLIGRAM(S): at 07:00

## 2018-02-06 RX ADMIN — Medication 216 GRAM(S): at 04:18

## 2018-02-06 RX ADMIN — Medication 1 TABLET(S): at 13:06

## 2018-02-06 NOTE — PROGRESS NOTE ADULT - PROBLEM SELECTOR PLAN 2
Social work consult pending.   -Utox positive for cocaine, opiate and thc.  -discontinue PCA pump as per pain management -Utox positive for cocaine, opiate and thc.  -discontinued PCA pump as per pain management  -Psych consult pending

## 2018-02-06 NOTE — PROGRESS NOTE ADULT - ASSESSMENT
CHELSEA KEY is a 32y Female who presents with pain in the pelvic region which is nociceptive in nature due to s/p .  Pain is controlled with current regimen.     Co-morbid Conditions:  False labor after 37 completed weeks of gestation  Drug abuse   delivery delivered  FALSE LABOR AT OR AFTER 37 COM

## 2018-02-06 NOTE — PROGRESS NOTE ADULT - SUBJECTIVE AND OBJECTIVE BOX
Chief Complaint: postoperative pelvic pain       HPI:   CHELSEA KEY or VICTOR MANUEL RACHEL as per SW is a 32y Female that was seen and examined at bedside. Patient laying in right lateral recumbent position and reporting no pain this AM. She reports inability to sleep last night and requests multiple times ot be left alone. She admits to flatus but denies BM. She denies any nausea, vomiting. Nursing reports she has had poor PO intake only consuming small quantities of liquid. Nursing reports patient was able to ambulate and subsequently voided approx 900cc urine. Patient refusing to answer any further questions as she requests to be left alone to sleep.       REVIEW OF SYSTEMS: X denotes positive finding, otherwise all additional items were reviewed and negative  GEN: [] fever, [] chills, [] change in appetite, [] weight loss, [] fatigue, [x] sedation  ENT: [] change in vision, [] dry mouth, [] ringing in ears, [] sore throat  RESP: [] shortness of breath, [] Obstructive sleep apnea  CV: [] chest pain, [] palpitations   GI: [] nausea, [] vomiting, [] flatus, [] recent BM, [] constipation, [] GERD  : [] bladder dysfunction, [] dysuria  MSK: [] weakness, [] joint pain, [] myalgias  Neuro: [x] pain, [] numbness, [] tingling, [] tremor, [] seizures  Skin: [] rash, [] pruritis  Psych: [] anxiety, [] depression  Endo: [] polydipsia  Heme: [] coagulopathic disorder      Home Medications:  none provided    PAST MEDICAL & SURGICAL HISTORY:  +illicit drug use   s/p     SOCIAL HISTORY:  Denies Smoking, Alcohol, or Drug Use  FAMILY HISTORY:  unable to obtain       Allergies  Allergy Status Unknown      PAIN MEDICATIONS:  MEDICATIONS  (STANDING):  acetaminophen   Tablet. 975 milliGRAM(s) Oral every 8 hours  ampicillin  IVPB 2 Gram(s) IV Intermittent every 6 hours  ampicillin  IVPB      citric acid/sodium citrate Solution 30 milliLiter(s) Oral once  cyclobenzaprine 5 milliGRAM(s) Oral three times a day  diphtheria/tetanus/pertussis (acellular) Vaccine (ADAcel) 0.5 milliLiter(s) IntraMuscular once  ferrous    sulfate 325 milliGRAM(s) Oral daily  ibuprofen  Tablet 600 milliGRAM(s) Oral four times a day  lactated ringers Bolus 1000 milliLiter(s) IV Bolus once  lactated ringers. 1000 milliLiter(s) (125 mL/Hr) IV Continuous <Continuous>  lactated ringers. 1000 milliLiter(s) (125 mL/Hr) IV Continuous <Continuous>  oxytocin Infusion 41.667 milliUNIT(s)/Min (125 mL/Hr) IV Continuous <Continuous>  prenatal multivitamin 1 Tablet(s) Oral daily    MEDICATIONS  (PRN):  acetaminophen   Tablet 650 milliGRAM(s) Oral every 6 hours PRN For Temp greater than 38.5 C (101.3 F)  diphenhydrAMINE   Capsule 25 milliGRAM(s) Oral every 6 hours PRN Itching  docusate sodium 100 milliGRAM(s) Oral two times a day PRN Stool Softening  glycerin Suppository - Adult 1 Suppository(s) Rectal at bedtime PRN Constipation  lanolin Ointment 1 Application(s) Topical every 3 hours PRN Sore Nipples  metoclopramide Injectable 10 milliGRAM(s) IV Push once PRN Nausea and/or Vomiting  simethicone 80 milliGRAM(s) Chew every 4 hours PRN Gas      Vital Signs Last 24 Hrs  T(C): 37.1 (2018 00:14), Max: 37.2 (2018 12:12)  T(F): 98.7 (2018 00:14), Max: 98.9 (2018 12:12)  HR: 76 (2018 06:05) (71 - 94)  BP: 141/81 (2018 06:05) (139/81 - 155/88)  BP(mean): --  RR: 18 (2018 06:05) (18 - 24)  SpO2: --             PHYSICAL EXAM: X denotes positive finding   GENERAL: [] cooperative, [x] uncooperative, [x] NAD, [] in distress, [] speech clear and coherent  HEENT: [x] NCAT, [] EOMI, [] pupils non pinpoint, [x] no external deformities, [] NGT   NECK: [] normal overall appearance, [x] no gross masses  RESPIRATORY: [x] unlabored respirations, [x] on room air, [] on supplemental O2, [] labored respirations  CV: [x] regular rate, [] regular rhythm, [] no LE edema, [] LE edema  Abdomen: [] soft, [] non-tender, [x] tender to palpation, [] pos bowel sounds  : [] knox, [x] deferred  Skin: [] normal texture, [] rash, [x] lesion(s), [] drain(s)   MSK: [x] limited AROM, [] extremities antigravity x4, [] normal gait  Neuro: [x] SILT, [] sensation reduced to light touch, [] abnormal DTRs  Psych: [] oriented to person, place, time, [] displays insight, [x] limited/impaired insight, [] poor coping skills       LABS:                        9.4    24.2  )-----------( 261      ( 2018 07:06 )             28.1     Drug Screen:  Drug Screen W/PCP, Urine: Done ( @ 16:01)      Radiology:      :  unable to obtain as no name provided

## 2018-02-06 NOTE — PROGRESS NOTE ADULT - ASSESSMENT
32y  s/p emergent  due to fetal distress at unknown gestational age PPD#2. Patient is poor historian and is not cooperative with history of heavy drug abuse. Utox positive for cocaine, opiates and THC.  PCA pump was discontinued yesterday. Social work consult pending.

## 2018-02-06 NOTE — PROGRESS NOTE ADULT - SUBJECTIVE AND OBJECTIVE BOX
32y  s/p emergent  due to fetal distress at unknown gestational age PPD#2.   Patient seen and examined at bedside, no acute overnight events.   Patient is not ambulating, refusing to eating, refusing PO hydration, -BM as per RN, +Formula feeding only, and she denies being in pain. Patient is not cooperative. Refuses to provide history. States she does not feel well but refuses to explain further.   ROS: Denies headache, SOB, fever, chills and calf pain.    VS:   Vital Signs Last 24 Hrs  T(C): 37.1 (2018 00:14), Max: 37.2 (2018 12:12)  T(F): 98.7 (2018 00:14), Max: 98.9 (2018 12:12)  HR: 76 (2018 06:05) (71 - 94)  BP: 141/81 (2018 06:05) (134/81 - 155/88)  RR: 18 (2018 06:05) (18 - 24)    Physical Exam:  Gen: NAD  Pt refusing to allow physical exam.     Labs:                        13.5   37.0  )-----------( 295      ( 2018 15:42 )             38.3     Urinalysis Basic - ( 2018 15:50 )    Color: Yellow / Appearance: Slightly Turbid / S.025 / pH: x  Gluc: x / Ketone: Large  / Bili: Negative / Urobili: 1 mg/dL   Blood: x / Protein: 100 mg/dL / Nitrite: Positive   Leuk Esterase: Moderate / RBC: 11-25 /HPF / WBC >50   Sq Epi: x / Non Sq Epi: Few / Bacteria: Moderate      MEDICATIONS  (STANDING):  acetaminophen   Tablet. 975 milliGRAM(s) Oral every 8 hours  ampicillin  IVPB 2 Gram(s) IV Intermittent every 6 hours  ampicillin  IVPB      citric acid/sodium citrate Solution 30 milliLiter(s) Oral once  cyclobenzaprine 5 milliGRAM(s) Oral three times a day  diphtheria/tetanus/pertussis (acellular) Vaccine (ADAcel) 0.5 milliLiter(s) IntraMuscular once  ferrous    sulfate 325 milliGRAM(s) Oral daily  ibuprofen  Tablet 600 milliGRAM(s) Oral four times a day  lactated ringers Bolus 1000 milliLiter(s) IV Bolus once  lactated ringers. 1000 milliLiter(s) (125 mL/Hr) IV Continuous <Continuous>  lactated ringers. 1000 milliLiter(s) (125 mL/Hr) IV Continuous <Continuous>  oxytocin Infusion 41.667 milliUNIT(s)/Min (125 mL/Hr) IV Continuous <Continuous>  prenatal multivitamin 1 Tablet(s) Oral daily    MEDICATIONS  (PRN):  acetaminophen   Tablet 650 milliGRAM(s) Oral every 6 hours PRN For Temp greater than 38.5 C (101.3 F)  diphenhydrAMINE   Capsule 25 milliGRAM(s) Oral every 6 hours PRN Itching  docusate sodium 100 milliGRAM(s) Oral two times a day PRN Stool Softening  glycerin Suppository - Adult 1 Suppository(s) Rectal at bedtime PRN Constipation  lanolin Ointment 1 Application(s) Topical every 3 hours PRN Sore Nipples  metoclopramide Injectable 10 milliGRAM(s) IV Push once PRN Nausea and/or Vomiting  simethicone 80 milliGRAM(s) Chew every 4 hours PRN Gas

## 2018-02-06 NOTE — PROGRESS NOTE ADULT - PROBLEM SELECTOR PLAN 1
Continue with routine postpartum care.   Encourage mother baby interaction.

## 2018-02-06 NOTE — PROGRESS NOTE ADULT - SUBJECTIVE AND OBJECTIVE BOX
32y  s/p emergent  due to fetal distress at unknown gestational age PPD#2.   Patient seen and examined at bedside, no acute overnight events.   Patient is not ambulating, refusing to eating, refusing PO hydration, -BM and last voided yesterday at 5 pm as per RN, +Formula feeding only, and she denies being in pain. Patient is not cooperative.   ROS: Denies headache, SOB, fever, chills and calf pain.    VS:   Vital Signs Last 24 Hrs  T(C): 37.1 (2018 00:14), Max: 37.2 (2018 12:12)  T(F): 98.7 (2018 00:14), Max: 98.9 (2018 12:12)  HR: 76 (2018 06:05) (71 - 94)  BP: 141/81 (2018 06:05) (139/81 - 155/88)  RR: 18 (2018 06:05) (18 - 24)    Physical Exam:  Gen: appears sedated  Abd: soft, tender to palpation. Incision is dry, intact, no bleeding or discharge noted.       Labs:                        13.5   37.0  )-----------( 295      ( 2018 15:42 )             38.3     Urinalysis Basic - ( 2018 15:50 )    Color: Yellow / Appearance: Slightly Turbid / S.025 / pH: x  Gluc: x / Ketone: Large  / Bili: Negative / Urobili: 1 mg/dL   Blood: x / Protein: 100 mg/dL / Nitrite: Positive   Leuk Esterase: Moderate / RBC: 11-25 /HPF / WBC >50   Sq Epi: x / Non Sq Epi: Few / Bacteria: Moderate      MEDICATIONS  (STANDING):  acetaminophen   Tablet. 975 milliGRAM(s) Oral every 8 hours  ampicillin  IVPB 2 Gram(s) IV Intermittent every 6 hours  ampicillin  IVPB      citric acid/sodium citrate Solution 30 milliLiter(s) Oral once  cyclobenzaprine 5 milliGRAM(s) Oral three times a day  diphtheria/tetanus/pertussis (acellular) Vaccine (ADAcel) 0.5 milliLiter(s) IntraMuscular once  ferrous    sulfate 325 milliGRAM(s) Oral daily  ibuprofen  Tablet 600 milliGRAM(s) Oral four times a day  lactated ringers Bolus 1000 milliLiter(s) IV Bolus once  lactated ringers. 1000 milliLiter(s) (125 mL/Hr) IV Continuous <Continuous>  lactated ringers. 1000 milliLiter(s) (125 mL/Hr) IV Continuous <Continuous>  oxytocin Infusion 41.667 milliUNIT(s)/Min (125 mL/Hr) IV Continuous <Continuous>  prenatal multivitamin 1 Tablet(s) Oral daily    MEDICATIONS  (PRN):  acetaminophen   Tablet 650 milliGRAM(s) Oral every 6 hours PRN For Temp greater than 38.5 C (101.3 F)  diphenhydrAMINE   Capsule 25 milliGRAM(s) Oral every 6 hours PRN Itching  docusate sodium 100 milliGRAM(s) Oral two times a day PRN Stool Softening  glycerin Suppository - Adult 1 Suppository(s) Rectal at bedtime PRN Constipation  lanolin Ointment 1 Application(s) Topical every 3 hours PRN Sore Nipples  metoclopramide Injectable 10 milliGRAM(s) IV Push once PRN Nausea and/or Vomiting  simethicone 80 milliGRAM(s) Chew every 4 hours PRN Gas 32y  s/p emergent  due to fetal distress at unknown gestational age PPD#2.   Patient seen and examined at bedside, no acute overnight events.   Patient is not ambulating, refusing to eating, refusing PO hydration, -BM and last voided yesterday at 5 pm as per RN, +Formula feeding only, and she denies being in pain. Patient is not cooperative.   She states she has a history of heroine abuse and last used the day prior to admission.     VS:   Vital Signs Last 24 Hrs  T(C): 37.1 (2018 00:14), Max: 37.2 (2018 12:12)  T(F): 98.7 (2018 00:14), Max: 98.9 (2018 12:12)  HR: 76 (2018 06:05) (71 - 94)  BP: 141/81 (2018 06:05) (139/81 - 155/88)  RR: 18 (2018 06:05) (18 - 24)    Physical Exam:  Gen: appears sedated  Abd: soft, tender to palpation. Incision is dry, intact, no bleeding or discharge noted.     Bedside sono showed distended bladder.     Labs:                        13.5   37.0  )-----------( 295      ( 2018 15:42 )             38.3     Urinalysis Basic - ( 2018 15:50 )    Color: Yellow / Appearance: Slightly Turbid / S.025 / pH: x  Gluc: x / Ketone: Large  / Bili: Negative / Urobili: 1 mg/dL   Blood: x / Protein: 100 mg/dL / Nitrite: Positive   Leuk Esterase: Moderate / RBC: 11-25 /HPF / WBC >50   Sq Epi: x / Non Sq Epi: Few / Bacteria: Moderate      MEDICATIONS  (STANDING):  acetaminophen   Tablet. 975 milliGRAM(s) Oral every 8 hours  ampicillin  IVPB 2 Gram(s) IV Intermittent every 6 hours  ampicillin  IVPB      citric acid/sodium citrate Solution 30 milliLiter(s) Oral once  cyclobenzaprine 5 milliGRAM(s) Oral three times a day  diphtheria/tetanus/pertussis (acellular) Vaccine (ADAcel) 0.5 milliLiter(s) IntraMuscular once  ferrous    sulfate 325 milliGRAM(s) Oral daily  ibuprofen  Tablet 600 milliGRAM(s) Oral four times a day  lactated ringers Bolus 1000 milliLiter(s) IV Bolus once  lactated ringers. 1000 milliLiter(s) (125 mL/Hr) IV Continuous <Continuous>  lactated ringers. 1000 milliLiter(s) (125 mL/Hr) IV Continuous <Continuous>  oxytocin Infusion 41.667 milliUNIT(s)/Min (125 mL/Hr) IV Continuous <Continuous>  prenatal multivitamin 1 Tablet(s) Oral daily    MEDICATIONS  (PRN):  acetaminophen   Tablet 650 milliGRAM(s) Oral every 6 hours PRN For Temp greater than 38.5 C (101.3 F)  diphenhydrAMINE   Capsule 25 milliGRAM(s) Oral every 6 hours PRN Itching  docusate sodium 100 milliGRAM(s) Oral two times a day PRN Stool Softening  glycerin Suppository - Adult 1 Suppository(s) Rectal at bedtime PRN Constipation  lanolin Ointment 1 Application(s) Topical every 3 hours PRN Sore Nipples  metoclopramide Injectable 10 milliGRAM(s) IV Push once PRN Nausea and/or Vomiting  simethicone 80 milliGRAM(s) Chew every 4 hours PRN Gas

## 2018-02-06 NOTE — PROGRESS NOTE ADULT - SUBJECTIVE AND OBJECTIVE BOX
" I am sleeping declined to be interviewed  requested I return tomorrow  No current withdrawal sx per RN  recall if needed

## 2018-02-06 NOTE — PROGRESS NOTE ADULT - PROBLEM SELECTOR PLAN 2
Social work consult pending.   -Utox positive for cocaine, opiate and thc.  -discontinue PCA pump as per pain management

## 2018-02-06 NOTE — PROGRESS NOTE ADULT - ASSESSMENT
32y  s/p emergent  due to fetal distress at unknown gestational age PPD#2. Patient is poor historian and is not cooperative with history of heavy drug abuse. Utox positive for cocaine, opiates and THC.  PCA pump was discontinued yesterday. Social work consult pending. 32y  s/p emergent  due to fetal distress at unknown gestational age PPD#2. Patient is poor historian and is not cooperative with history of heavy drug abuse. Utox positive for cocaine, opiates and THC.  PCA pump was discontinued yesterday. Bedside sono showed distended bladder, patient was able to void afterwards (900 mL). Patient is likely in opiate withdrawal. Psych consult pending for recommendations on withdrawal management.

## 2018-02-07 VITALS
HEART RATE: 84 BPM | RESPIRATION RATE: 20 BRPM | SYSTOLIC BLOOD PRESSURE: 141 MMHG | DIASTOLIC BLOOD PRESSURE: 84 MMHG | TEMPERATURE: 98 F

## 2018-02-07 LAB
HCT VFR BLD CALC: 28.1 % — LOW (ref 37–47)
HGB BLD-MCNC: 9.3 G/DL — LOW (ref 12–16)
MCHC RBC-ENTMCNC: 30.5 PG — SIGNIFICANT CHANGE UP (ref 27–31)
MCHC RBC-ENTMCNC: 33.1 G/DL — SIGNIFICANT CHANGE UP (ref 32–36)
MCV RBC AUTO: 92.1 FL — SIGNIFICANT CHANGE UP (ref 81–99)
PLATELET # BLD AUTO: 286 K/UL — SIGNIFICANT CHANGE UP (ref 150–400)
RBC # BLD: 3.05 M/UL — LOW (ref 4.4–5.2)
RBC # FLD: 13.1 % — SIGNIFICANT CHANGE UP (ref 11–15.6)
WBC # BLD: 15.5 K/UL — HIGH (ref 4.8–10.8)
WBC # FLD AUTO: 15.5 K/UL — HIGH (ref 4.8–10.8)

## 2018-02-07 PROCEDURE — 99222 1ST HOSP IP/OBS MODERATE 55: CPT

## 2018-02-07 PROCEDURE — 99231 SBSQ HOSP IP/OBS SF/LOW 25: CPT

## 2018-02-07 RX ORDER — IBUPROFEN 200 MG
1 TABLET ORAL
Qty: 40 | Refills: 0
Start: 2018-02-07 | End: 2018-02-16

## 2018-02-07 RX ORDER — FERROUS SULFATE 325(65) MG
1 TABLET ORAL
Qty: 28 | Refills: 0
Start: 2018-02-07 | End: 2018-03-06

## 2018-02-07 RX ORDER — ASCORBIC ACID 60 MG
1 TABLET,CHEWABLE ORAL
Qty: 28 | Refills: 0
Start: 2018-02-07 | End: 2018-03-06

## 2018-02-07 RX ORDER — DOCUSATE SODIUM 100 MG
1 CAPSULE ORAL
Qty: 56 | Refills: 0
Start: 2018-02-07 | End: 2018-03-06

## 2018-02-07 RX ORDER — CEPHALEXIN 500 MG
500 CAPSULE ORAL
Refills: 0 | Status: DISCONTINUED | OUTPATIENT
Start: 2018-02-07 | End: 2018-02-07

## 2018-02-07 RX ADMIN — Medication 975 MILLIGRAM(S): at 05:33

## 2018-02-07 RX ADMIN — Medication 216 GRAM(S): at 04:51

## 2018-02-07 RX ADMIN — Medication 600 MILLIGRAM(S): at 01:50

## 2018-02-07 RX ADMIN — Medication 600 MILLIGRAM(S): at 05:33

## 2018-02-07 RX ADMIN — CYCLOBENZAPRINE HYDROCHLORIDE 5 MILLIGRAM(S): 10 TABLET, FILM COATED ORAL at 05:33

## 2018-02-07 RX ADMIN — Medication 216 GRAM(S): at 10:08

## 2018-02-07 RX ADMIN — Medication 600 MILLIGRAM(S): at 00:50

## 2018-02-07 NOTE — BEHAVIORAL HEALTH ASSESSMENT NOTE - NSBHCONSULTRECOMMENDOTHER_PSY_A_CORE FT
Discussed with patient treatment options, importance of treatment for her and for her baby especially with CPS involved. Referral for [facility] [place] for mental health and substance use counseling.

## 2018-02-07 NOTE — BEHAVIORAL HEALTH ASSESSMENT NOTE - NSBHCHARTREVIEWVS_PSY_A_CORE FT
T(C): 36.8 (07 Feb 2018 09:45), Max: 37.2 (06 Feb 2018 20:00)  T(F): 98.2 (07 Feb 2018 09:45), Max: 99 (06 Feb 2018 20:00)  HR: 72 (07 Feb 2018 09:45) (72 - 85)  BP: 143/80 (07 Feb 2018 09:45) (137/82 - 143/80)  RR: 20 (07 Feb 2018 09:45) (20 - 20)

## 2018-02-07 NOTE — PROGRESS NOTE ADULT - SUBJECTIVE AND OBJECTIVE BOX
Postpartum Note,  Section  She is a  32y woman who is now post-operative day: 3    Subjective:  The patient feels better, less pain.  She is ambulating and tolerating a diet  She is having bowel movements and flatus  She reports no breathing problems  She reports no headache or visual changes  She reports normal postpartum bleeding    Physical exam:  She generally looks and feels well    Vital Signs Last 24 Hrs  T(C): 37.2 (2018 20:00), Max: 37.2 (2018 08:30)  T(F): 99 (2018 20:00), Max: 99 (2018 08:30)  HR: 84 (2018 20:00) (76 - 85)  BP: 139/84 (2018 20:00) (137/82 - 156/95)  BP(mean): --  RR: 20 (2018 20:00) (20 - 20)  SpO2: --    Lungs: Normal  Heart: Regular rate and rhythm  Abdomen: Soft, nontender, no distension , firm uterine fundus, the incision is clean dry and intact  Pelvic: Normal lochia noted  Ext: No DVT signs, warm extremities, normal pulses    LABS:                Allergies    No Known Allergies    Intolerances      MEDICATIONS  (STANDING):  acetaminophen   Tablet. 975 milliGRAM(s) Oral every 8 hours  ampicillin  IVPB 2 Gram(s) IV Intermittent every 6 hours  ampicillin  IVPB      citric acid/sodium citrate Solution 30 milliLiter(s) Oral once  cyclobenzaprine 5 milliGRAM(s) Oral three times a day  diphtheria/tetanus/pertussis (acellular) Vaccine (ADAcel) 0.5 milliLiter(s) IntraMuscular once  ferrous    sulfate 325 milliGRAM(s) Oral daily  ibuprofen  Tablet 600 milliGRAM(s) Oral four times a day  lactated ringers Bolus 1000 milliLiter(s) IV Bolus once  lactated ringers. 1000 milliLiter(s) (125 mL/Hr) IV Continuous <Continuous>  lactated ringers. 1000 milliLiter(s) (125 mL/Hr) IV Continuous <Continuous>  oxytocin Infusion 41.667 milliUNIT(s)/Min (125 mL/Hr) IV Continuous <Continuous>  prenatal multivitamin 1 Tablet(s) Oral daily    MEDICATIONS  (PRN):  acetaminophen   Tablet 650 milliGRAM(s) Oral every 6 hours PRN For Temp greater than 38.5 C (101.3 F)  diphenhydrAMINE   Capsule 25 milliGRAM(s) Oral every 6 hours PRN Itching  docusate sodium 100 milliGRAM(s) Oral two times a day PRN Stool Softening  glycerin Suppository - Adult 1 Suppository(s) Rectal at bedtime PRN Constipation  lanolin Ointment 1 Application(s) Topical every 3 hours PRN Sore Nipples  metoclopramide Injectable 10 milliGRAM(s) IV Push once PRN Nausea and/or Vomiting  simethicone 80 milliGRAM(s) Chew every 4 hours PRN Gas      RADIOLOGY & ADDITIONAL TESTS:    Assessment and Plan   Section Day:  She feels well  Continue the current pain medication  Encourage ISS & Ambulation  Encourage regular diet   DVT ppx: SCDs  She is stable, tolerates a diet and has normal flatus and bowel movements  She will be discharged on Day 2,3 or 4   according to the normal criteria.

## 2018-02-07 NOTE — BEHAVIORAL HEALTH ASSESSMENT NOTE - SUMMARY
Pt is a 31 yo female with substance use disorder who presented for delivery of baby (no prenatal care, used drugs up until day before delivery). Does not seem motivated for treatment despite CPS involved in her case. She states she might be interested in outpatient therapy at [facility] for mental health and substance abuse.

## 2018-02-07 NOTE — BEHAVIORAL HEALTH ASSESSMENT NOTE - NSBHCHARTREVIEWLAB_PSY_A_CORE FT
9.3    15.5  )-----------( 286      ( 07 Feb 2018 08:03 )             28.1      THC, Urine Qualitative: Positive (02.04.18 @ 16:01)  Cocaine Metabolite, Urine: Positive (02.04.18 @ 16:01)  Opiate, Urine: Positive (02.04.18 @ 16:01)

## 2018-02-07 NOTE — PROGRESS NOTE ADULT - ATTENDING COMMENTS
PLAN-  #Controlled Substances:  - s/p Dilaudid IV PCA  - s/p Percocet   - avoid opioids with noted sedation   - Please monitor closely for oversedation and respiratory depression     #Adjuvant Analgesics:  - acetaminophen 975mg PO q8h standing if LFTs are WNL  - recommend change to Ibuprofen 600mg PO q6h prn moderate pain if renal function WNL  - recommend d/c of flexeril 5mg PO TID given sedation      #Bowel Regimen:  - per primary team as clinically indicated       Recommend Behavioral Health/Psychiatry consultation given cognitive status and unknown prior drug history with patient unwilling to elucidate details  Recommend social work consultation for consideration of inpatient vs outpatient drug rehab/detox.   Please page if any concerns: [phone].
Still need psych follow up.  Patient states that she has not seen the baby yet.  Post op care should be arranged.

## 2018-02-07 NOTE — BEHAVIORAL HEALTH ASSESSMENT NOTE - HPI (INCLUDE ILLNESS QUALITY, SEVERITY, DURATION, TIMING, CONTEXT, MODIFYING FACTORS, ASSOCIATED SIGNS AND SYMPTOMS)
Pt is a 31 yo female with PMHx of substance use (heroin and cocaine - sniffs x1 year, occasional marijuana) who presented for delivery of baby (no prenatal care, requiring emergency  for fetal distress, pt was using drugs until day before delivery). Consulted to see pt yesterday, pt declined interview at that time. Second interview attempt today was successful. Pt states she is feeling better than yesterday and would like to go home today. Pt is aware that her baby is in NICU on morphine going through withdrawal, and aware that CPS is involved in her case. Pt states she knows that she "should have gotten help" during her pregnancy. Pt gives very short answers throughout interview ("yes", "no", "I don't know"). States she is interested in outpatient therapy for substance use. She denies current withdrawal symptoms or S/H I/I/P .

## 2018-02-08 LAB
CULTURE RESULTS: SIGNIFICANT CHANGE UP
SPECIMEN SOURCE: SIGNIFICANT CHANGE UP

## 2018-02-14 LAB — SURGICAL PATHOLOGY FINAL REPORT - CH: SIGNIFICANT CHANGE UP

## 2019-10-06 NOTE — BEHAVIORAL HEALTH ASSESSMENT NOTE - NSBHCONSULTPRIMARYDISCUSSYES_PSY_A_CORE FT
Normal vision: sees adequately in most situations; can see medication labels, newsprint Discussed [facility] referral with Wilda Hernandez LMSW

## 2020-01-22 NOTE — BEHAVIORAL HEALTH ASSESSMENT NOTE - FAMILY HISTORY OF MEDICAL ILLNESS
Is This A New Presentation, Or A Follow-Up?: Skin Lesion
How Severe Is Your Skin Lesion?: mild
Has Your Skin Lesion Been Treated?: not been treated
None known

## 2021-03-22 ENCOUNTER — EMERGENCY (EMERGENCY)
Facility: HOSPITAL | Age: 36
LOS: 1 days | Discharge: HOME | End: 2021-03-22
Attending: EMERGENCY MEDICINE
Payer: SELF-PAY

## 2021-03-22 VITALS
TEMPERATURE: 99 F | SYSTOLIC BLOOD PRESSURE: 118 MMHG | OXYGEN SATURATION: 100 % | RESPIRATION RATE: 16 BRPM | DIASTOLIC BLOOD PRESSURE: 77 MMHG | HEART RATE: 75 BPM

## 2021-03-22 VITALS
WEIGHT: 85.1 LBS | SYSTOLIC BLOOD PRESSURE: 98 MMHG | DIASTOLIC BLOOD PRESSURE: 71 MMHG | RESPIRATION RATE: 18 BRPM | OXYGEN SATURATION: 92 % | HEIGHT: 66 IN | HEART RATE: 158 BPM | TEMPERATURE: 99 F

## 2021-03-22 LAB
ALBUMIN SERPL ELPH-MCNC: 5.2 G/DL — SIGNIFICANT CHANGE UP (ref 3.3–5.2)
ALP SERPL-CCNC: 82 U/L — SIGNIFICANT CHANGE UP (ref 40–120)
ALT FLD-CCNC: 19 U/L — SIGNIFICANT CHANGE UP
AMPHET UR-MCNC: NEGATIVE — SIGNIFICANT CHANGE UP
ANION GAP SERPL CALC-SCNC: 31 MMOL/L — HIGH (ref 5–17)
ANISOCYTOSIS BLD QL: SLIGHT — SIGNIFICANT CHANGE UP
APAP SERPL-MCNC: <3 UG/ML — LOW (ref 10–26)
APPEARANCE UR: CLEAR — SIGNIFICANT CHANGE UP
APTT BLD: 33.5 SEC — SIGNIFICANT CHANGE UP (ref 27.5–35.5)
AST SERPL-CCNC: 17 U/L — SIGNIFICANT CHANGE UP
BACTERIA # UR AUTO: NEGATIVE — SIGNIFICANT CHANGE UP
BARBITURATES UR SCN-MCNC: NEGATIVE — SIGNIFICANT CHANGE UP
BASOPHILS # BLD AUTO: 0 K/UL — SIGNIFICANT CHANGE UP (ref 0–0.2)
BASOPHILS NFR BLD AUTO: 0 % — SIGNIFICANT CHANGE UP (ref 0–2)
BENZODIAZ UR-MCNC: NEGATIVE — SIGNIFICANT CHANGE UP
BILIRUB SERPL-MCNC: 0.6 MG/DL — SIGNIFICANT CHANGE UP (ref 0.4–2)
BILIRUB UR-MCNC: NEGATIVE — SIGNIFICANT CHANGE UP
BUN SERPL-MCNC: 79 MG/DL — HIGH (ref 8–20)
CALCIUM SERPL-MCNC: 8.8 MG/DL — SIGNIFICANT CHANGE UP (ref 8.6–10.2)
CHLORIDE SERPL-SCNC: 92 MMOL/L — LOW (ref 98–107)
CO2 SERPL-SCNC: 25 MMOL/L — SIGNIFICANT CHANGE UP (ref 22–29)
COCAINE METAB.OTHER UR-MCNC: POSITIVE
COLOR SPEC: YELLOW — SIGNIFICANT CHANGE UP
CREAT SERPL-MCNC: 5.99 MG/DL — HIGH (ref 0.5–1.3)
DIFF PNL FLD: ABNORMAL
EOSINOPHIL # BLD AUTO: 0 K/UL — SIGNIFICANT CHANGE UP (ref 0–0.5)
EOSINOPHIL NFR BLD AUTO: 0 % — SIGNIFICANT CHANGE UP (ref 0–6)
EPI CELLS # UR: SIGNIFICANT CHANGE UP
ETHANOL SERPL-MCNC: <10 MG/DL — SIGNIFICANT CHANGE UP (ref 0–9)
GIANT PLATELETS BLD QL SMEAR: PRESENT — SIGNIFICANT CHANGE UP
GLUCOSE SERPL-MCNC: 228 MG/DL — HIGH (ref 70–99)
GLUCOSE UR QL: NEGATIVE MG/DL — SIGNIFICANT CHANGE UP
HCG SERPL-ACNC: <4 MIU/ML — SIGNIFICANT CHANGE UP
HCT VFR BLD CALC: 50.8 % — HIGH (ref 34.5–45)
HGB BLD-MCNC: 17 G/DL — HIGH (ref 11.5–15.5)
HIV 1 & 2 AB SERPL IA.RAPID: SIGNIFICANT CHANGE UP
HYPOSEGMENTATION: PRESENT — SIGNIFICANT CHANGE UP
INR BLD: 1.18 RATIO — HIGH (ref 0.88–1.16)
KETONES UR-MCNC: ABNORMAL
LACTATE BLDV-MCNC: 4.8 MMOL/L — CRITICAL HIGH (ref 0.5–2)
LACTATE BLDV-MCNC: 4.9 MMOL/L — CRITICAL HIGH (ref 0.5–2)
LEUKOCYTE ESTERASE UR-ACNC: ABNORMAL
LYMPHOCYTES # BLD AUTO: 0.81 K/UL — LOW (ref 1–3.3)
LYMPHOCYTES # BLD AUTO: 4.7 % — LOW (ref 13–44)
MACROCYTES BLD QL: SLIGHT — SIGNIFICANT CHANGE UP
MANUAL SMEAR VERIFICATION: SIGNIFICANT CHANGE UP
MCHC RBC-ENTMCNC: 30.9 PG — SIGNIFICANT CHANGE UP (ref 27–34)
MCHC RBC-ENTMCNC: 33.5 GM/DL — SIGNIFICANT CHANGE UP (ref 32–36)
MCV RBC AUTO: 92.4 FL — SIGNIFICANT CHANGE UP (ref 80–100)
METHADONE UR-MCNC: NEGATIVE — SIGNIFICANT CHANGE UP
MONOCYTES # BLD AUTO: 1.62 K/UL — HIGH (ref 0–0.9)
MONOCYTES NFR BLD AUTO: 9.4 % — SIGNIFICANT CHANGE UP (ref 2–14)
NEUTROPHILS # BLD AUTO: 14.45 K/UL — HIGH (ref 1.8–7.4)
NEUTROPHILS NFR BLD AUTO: 73.6 % — SIGNIFICANT CHANGE UP (ref 43–77)
NEUTS BAND # BLD: 10.4 % — HIGH (ref 0–8)
NITRITE UR-MCNC: NEGATIVE — SIGNIFICANT CHANGE UP
OPIATES UR-MCNC: NEGATIVE — SIGNIFICANT CHANGE UP
PCP SPEC-MCNC: SIGNIFICANT CHANGE UP
PCP UR-MCNC: NEGATIVE — SIGNIFICANT CHANGE UP
PH UR: 5 — SIGNIFICANT CHANGE UP (ref 5–8)
PLAT MORPH BLD: NORMAL — SIGNIFICANT CHANGE UP
PLATELET # BLD AUTO: 391 K/UL — SIGNIFICANT CHANGE UP (ref 150–400)
POLYCHROMASIA BLD QL SMEAR: SLIGHT — SIGNIFICANT CHANGE UP
POTASSIUM SERPL-MCNC: 4.2 MMOL/L — SIGNIFICANT CHANGE UP (ref 3.5–5.3)
POTASSIUM SERPL-SCNC: 4.2 MMOL/L — SIGNIFICANT CHANGE UP (ref 3.5–5.3)
PROT SERPL-MCNC: 9.1 G/DL — HIGH (ref 6.6–8.7)
PROT UR-MCNC: 30 MG/DL
PROTHROM AB SERPL-ACNC: 13.6 SEC — SIGNIFICANT CHANGE UP (ref 10.6–13.6)
RBC # BLD: 5.5 M/UL — HIGH (ref 3.8–5.2)
RBC # FLD: 12.4 % — SIGNIFICANT CHANGE UP (ref 10.3–14.5)
RBC BLD AUTO: ABNORMAL
RBC CASTS # UR COMP ASSIST: SIGNIFICANT CHANGE UP /HPF (ref 0–4)
SALICYLATES SERPL-MCNC: <0.6 MG/DL — LOW (ref 10–20)
SODIUM SERPL-SCNC: 148 MMOL/L — HIGH (ref 135–145)
SP GR SPEC: 1.02 — SIGNIFICANT CHANGE UP (ref 1.01–1.02)
STOMATOCYTES BLD QL SMEAR: SLIGHT — SIGNIFICANT CHANGE UP
T4 AB SER-ACNC: 5.1 UG/DL — SIGNIFICANT CHANGE UP (ref 4.5–12)
THC UR QL: NEGATIVE — SIGNIFICANT CHANGE UP
TSH SERPL-MCNC: 0.45 UIU/ML — SIGNIFICANT CHANGE UP (ref 0.27–4.2)
UROBILINOGEN FLD QL: NEGATIVE MG/DL — SIGNIFICANT CHANGE UP
VARIANT LYMPHS # BLD: 1.9 % — SIGNIFICANT CHANGE UP (ref 0–6)
WBC # BLD: 17.2 K/UL — HIGH (ref 3.8–10.5)
WBC # FLD AUTO: 17.2 K/UL — HIGH (ref 3.8–10.5)
WBC UR QL: SIGNIFICANT CHANGE UP

## 2021-03-22 PROCEDURE — 96361 HYDRATE IV INFUSION ADD-ON: CPT

## 2021-03-22 PROCEDURE — 80307 DRUG TEST PRSMV CHEM ANLYZR: CPT

## 2021-03-22 PROCEDURE — 93010 ELECTROCARDIOGRAM REPORT: CPT

## 2021-03-22 PROCEDURE — 99291 CRITICAL CARE FIRST HOUR: CPT

## 2021-03-22 PROCEDURE — 71045 X-RAY EXAM CHEST 1 VIEW: CPT | Mod: 26

## 2021-03-22 PROCEDURE — 83735 ASSAY OF MAGNESIUM: CPT

## 2021-03-22 PROCEDURE — 84443 ASSAY THYROID STIM HORMONE: CPT

## 2021-03-22 PROCEDURE — 71045 X-RAY EXAM CHEST 1 VIEW: CPT

## 2021-03-22 PROCEDURE — 99285 EMERGENCY DEPT VISIT HI MDM: CPT

## 2021-03-22 PROCEDURE — 84436 ASSAY OF TOTAL THYROXINE: CPT

## 2021-03-22 PROCEDURE — 99284 EMERGENCY DEPT VISIT MOD MDM: CPT

## 2021-03-22 PROCEDURE — 84702 CHORIONIC GONADOTROPIN TEST: CPT

## 2021-03-22 PROCEDURE — 70450 CT HEAD/BRAIN W/O DYE: CPT

## 2021-03-22 PROCEDURE — 80053 COMPREHEN METABOLIC PANEL: CPT

## 2021-03-22 PROCEDURE — 86703 HIV-1/HIV-2 1 RESULT ANTBDY: CPT

## 2021-03-22 PROCEDURE — 93005 ELECTROCARDIOGRAM TRACING: CPT

## 2021-03-22 PROCEDURE — 85610 PROTHROMBIN TIME: CPT

## 2021-03-22 PROCEDURE — 81001 URINALYSIS AUTO W/SCOPE: CPT

## 2021-03-22 PROCEDURE — 82550 ASSAY OF CK (CPK): CPT

## 2021-03-22 PROCEDURE — 96368 THER/DIAG CONCURRENT INF: CPT

## 2021-03-22 PROCEDURE — 96366 THER/PROPH/DIAG IV INF ADDON: CPT

## 2021-03-22 PROCEDURE — 83605 ASSAY OF LACTIC ACID: CPT

## 2021-03-22 PROCEDURE — 36415 COLL VENOUS BLD VENIPUNCTURE: CPT

## 2021-03-22 PROCEDURE — 70450 CT HEAD/BRAIN W/O DYE: CPT | Mod: 26,MA

## 2021-03-22 PROCEDURE — 87040 BLOOD CULTURE FOR BACTERIA: CPT

## 2021-03-22 PROCEDURE — 99291 CRITICAL CARE FIRST HOUR: CPT | Mod: 25

## 2021-03-22 PROCEDURE — 96365 THER/PROPH/DIAG IV INF INIT: CPT

## 2021-03-22 PROCEDURE — 87086 URINE CULTURE/COLONY COUNT: CPT

## 2021-03-22 PROCEDURE — 85730 THROMBOPLASTIN TIME PARTIAL: CPT

## 2021-03-22 PROCEDURE — 85025 COMPLETE CBC W/AUTO DIFF WBC: CPT

## 2021-03-22 RX ORDER — VANCOMYCIN HCL 1 G
1000 VIAL (EA) INTRAVENOUS ONCE
Refills: 0 | Status: COMPLETED | OUTPATIENT
Start: 2021-03-22 | End: 2021-03-22

## 2021-03-22 RX ORDER — ACETAMINOPHEN 500 MG
650 TABLET ORAL ONCE
Refills: 0 | Status: COMPLETED | OUTPATIENT
Start: 2021-03-22 | End: 2021-03-22

## 2021-03-22 RX ORDER — SODIUM CHLORIDE 9 MG/ML
2000 INJECTION, SOLUTION INTRAVENOUS ONCE
Refills: 0 | Status: COMPLETED | OUTPATIENT
Start: 2021-03-22 | End: 2021-03-22

## 2021-03-22 RX ORDER — SODIUM CHLORIDE 9 MG/ML
1000 INJECTION, SOLUTION INTRAVENOUS ONCE
Refills: 0 | Status: COMPLETED | OUTPATIENT
Start: 2021-03-22 | End: 2021-03-22

## 2021-03-22 RX ORDER — ACETAMINOPHEN 500 MG
650 TABLET ORAL ONCE
Refills: 0 | Status: DISCONTINUED | OUTPATIENT
Start: 2021-03-22 | End: 2021-03-22

## 2021-03-22 RX ORDER — PIPERACILLIN AND TAZOBACTAM 4; .5 G/20ML; G/20ML
3.38 INJECTION, POWDER, LYOPHILIZED, FOR SOLUTION INTRAVENOUS ONCE
Refills: 0 | Status: COMPLETED | OUTPATIENT
Start: 2021-03-22 | End: 2021-03-22

## 2021-03-22 RX ADMIN — SODIUM CHLORIDE 2000 MILLILITER(S): 9 INJECTION, SOLUTION INTRAVENOUS at 11:20

## 2021-03-22 RX ADMIN — Medication 250 MILLIGRAM(S): at 10:50

## 2021-03-22 RX ADMIN — Medication 650 MILLIGRAM(S): at 11:20

## 2021-03-22 RX ADMIN — Medication 1000 MILLIGRAM(S): at 11:54

## 2021-03-22 RX ADMIN — PIPERACILLIN AND TAZOBACTAM 200 GRAM(S): 4; .5 INJECTION, POWDER, LYOPHILIZED, FOR SOLUTION INTRAVENOUS at 09:42

## 2021-03-22 RX ADMIN — PIPERACILLIN AND TAZOBACTAM 3.38 GRAM(S): 4; .5 INJECTION, POWDER, LYOPHILIZED, FOR SOLUTION INTRAVENOUS at 11:54

## 2021-03-22 RX ADMIN — Medication 650 MILLIGRAM(S): at 09:00

## 2021-03-22 RX ADMIN — SODIUM CHLORIDE 1000 MILLILITER(S): 9 INJECTION, SOLUTION INTRAVENOUS at 13:04

## 2021-03-22 RX ADMIN — SODIUM CHLORIDE 2000 MILLILITER(S): 9 INJECTION, SOLUTION INTRAVENOUS at 09:00

## 2021-03-22 NOTE — ED PROVIDER NOTE - CARE PLAN
Principal Discharge DX:	Acute renal failure, unspecified acute renal failure type  Secondary Diagnosis:	Cocaine abuse

## 2021-03-22 NOTE — ED ADULT NURSE NOTE - EXPLANATION OF PATIENT'S REASON FOR LEAVING
wanted to to drugs did not want to be in hospital, does not care about risk versus benefits md at bedside.

## 2021-03-22 NOTE — ED ADULT TRIAGE NOTE - CHIEF COMPLAINT QUOTE
Patient arrived to ED today with c/o weakness, vomiting, dehydration for the past two days, patient has not been eating.  Patient today had fall from bed onto the floor, had seizure-like activity as per lauren.

## 2021-03-22 NOTE — ED PROVIDER NOTE - PATIENT PORTAL LINK FT
You can access the FollowMyHealth Patient Portal offered by [facility] by registering at the following website: [email or web address]. By joining [employer]’s FollowMyHealth portal, you will also be able to view your health information using other applications (apps) compatible with our system.

## 2021-03-22 NOTE — ED ADULT NURSE NOTE - NSIMPLEMENTINTERV_GEN_ALL_ED
Implemented All Fall Risk Interventions:  [place] to call system. Call bell, personal items and telephone within reach. Instruct patient to call for assistance. Room bathroom lighting operational. Non-slip footwear when patient is off stretcher. Physically safe environment: no spills, clutter or unnecessary equipment. Stretcher in lowest position, wheels locked, appropriate side rails in place. Provide visual cue, wrist band, yellow gown, etc. Monitor gait and stability. Monitor for mental status changes and reorient to person, place, and time. Review medications for side effects contributing to fall risk. Reinforce activity limits and safety measures with patient and family.

## 2021-03-22 NOTE — ED PROVIDER NOTE - CONSTITUTIONAL, MLM
normal... cachectic with temporal wasting, lethargic, arouses to verbal stimuli cachectic with temporal wasting, somnolent, arouses to verbal stimuli

## 2021-03-22 NOTE — ED PROVIDER NOTE - NEUROLOGICAL, MLM
lethargic, arouses to verbal stimuli, not answering questions appropriately, but follows simple commands and moves all extremities equally somnolent, arouses to verbal stimuli, intermittently answering questions appropriately, but follows simple commands and moves all extremities equally

## 2021-03-22 NOTE — ED ADULT NURSE NOTE - OBJECTIVE STATEMENT
pt arrived to room not answering questions eyes rolling to back of head pt not oriented to place or situation but aox1 pt placed on monitor MD at bedside, IV placed sepsis called pt has diseminated red rash throught body legs arms back and stomach.  pt very thin in appearance HR >110 sustaining, MD aware EKG completed.  pt presents with fever.  pt shows no s/s of pain

## 2021-03-22 NOTE — ED PROVIDER NOTE - ENMT, MLM
Airway patent, Nasal mucosa clear. Mouth with dry oral mucosa. Throat has no vesicles, no oropharyngeal exudates and uvula is midline. neck is supple, no nuchal rigidity

## 2021-03-22 NOTE — ED PROVIDER NOTE - PROGRESS NOTE DETAILS
Mitchell: with IVF and throughout time allowing for metabolism of ilicit substances in her system here the pt is significantly more alert. She is now lucid, coherent and displays decision making capacity. I informed the pt that she is in acute kidney failure and is at very high risk of death and needs admission, but she states that does not wish to stay. I told the pt that it is critical and had a lengthy discussion with her. I also called her boyfriend Tyrone and had a discussion with her and Tyrone together urging her to reconsider and attempting to assuage any concerns she may have., She adamantly and repeatedly trefuses to stay, stating sh eunderstands she may die. ICU consult was called and they agree pt has capacity.

## 2021-03-22 NOTE — CONSULT NOTE ADULT - SUBJECTIVE AND OBJECTIVE BOX
CRITICAL CARE CONSULTATION    REASON FOR CONSULTATION/INTERVAL HPI:  36 yo female with hx of polysubstance abuse, was brought in to the ED with altered mental status.  Her boyfriend Tyrone noted her to weak and tried to get her out of bed but she was too weak to stand so she fell to the floor.  When she arrived she was noted to have MARYCARMEN, fever, tachycardia.  She was given several liters of IV fluids, underwent a CT showing no acute pathology, and a CXR also showing no pathology.    Patient now more alert and awake, admits to using a lot of cocaine over the past few days, more so than usual.  She states she just wants to go home, denies any complaints.     PMH: denies  Soc Hx: lives in a condo with her boyfriend Tyrone.  Has a young daughter who is in foster care and she hasn't seen in a while.  She is unemployed currently.  Smokes tobacco, drinks occasionally, uses cocaine.    FamHx: denies any relevant family hx    EXAM:  Patient is alert and oriented to person place and situation, cannot recall the date.  She is very respectful and polite ("yes sir, no sir") but is adamant about going home, "I just want to be in my own home."  She politely refused a physical exam.    RADIOLOGY: cxr - no infiltrate or large effusion     PAST MEDICAL & SURGICAL HISTORY:  No pertinent past medical history    No significant past surgical history      Allergies    No Known Allergies    Intolerances      SOCIAL HISTORY/FAMILY HISTORY:   Social History:    FAMILY HISTORY:    Medications:      12 point ROS performed, pertinent positives and negatives mentioned above, all other ROS negative.     T(F): 99 (21 @ 11:15), Max: 101.7 (21 @ 09:05)  HR: 90 (21 @ 11:15)  BP: 124/77 (21 @ 11:15)  BP(mean): --  ABP: --  RR: 16 (21 @ 11:15)  SpO2: 100% (21 @ 11:15)        LABS:                        17.0   17.20 )-----------( 391      ( 22 Mar 2021 09:12 )             50.8         148<H>  |  92<L>  |  79.0<H>  ----------------------------<  228<H>  4.2   |  25.0  |  5.99<H>    Ca    8.8      22 Mar 2021 09:12  Mg     3.3     03-    TPro  9.1<H>  /  Alb  5.2  /  TBili  0.6  /  DBili  x   /  AST  17  /  ALT  19  /  AlkPhos  82  03-      CARDIAC MARKERS ( 22 Mar 2021 09:12 )  x     / x     / 76 U/L / x     / x          CAPILLARY BLOOD GLUCOSE        PT/INR - ( 22 Mar 2021 09:12 )   PT: 13.6 sec;   INR: 1.18 ratio         PTT - ( 22 Mar 2021 09:12 )  PTT:33.5 sec  Urinalysis Basic - ( 22 Mar 2021 11:25 )    Color: Yellow / Appearance: Clear / S.025 / pH: x  Gluc: x / Ketone: Moderate  / Bili: Negative / Urobili: Negative mg/dL   Blood: x / Protein: 30 mg/dL / Nitrite: Negative   Leuk Esterase: Small / RBC: 0-2 /HPF / WBC 3-5   Sq Epi: x / Non Sq Epi: Occasional / Bacteria: Negative      CULTURES:      
[facility] DIVISION OF KIDNEY DISEASES AND HYPERTENSION -- INITIAL CONSULT NOTE  --------------------------------------------------------------------------------  HPI:  35 year old female with PMH heroin abuse (as per boyfriend, to his knowledge clean x 2 years) presents with AMS. Pt is lethargic and unable to provide any elements of the Hx. As per boyfriend, he noticed 3 days of worsening generalized weakness, fatigue, and poor PO intake. Then this morning, she tried to get out of bed, and fell to the floor. He stated that she had muscle spasm of her b/l hands. no generalized convulsions.  Pt seen/examined in ER; lactic acidosis; renal failure; admits to drug use; pt crying wishing to leave against medical advice. I explained need for IV fluids; and possibility of dialysis; and risk of death given her renal failure, despite this; patient still wishing to leave against medical advice.       PAST HISTORY  --------------------------------------------------------------------------------  PAST MEDICAL & SURGICAL HISTORY:  No pertinent past medical history    No significant past surgical history      FAMILY HISTORY:    PAST SOCIAL HISTORY:    ALLERGIES & MEDICATIONS  --------------------------------------------------------------------------------  Allergies    No Known Allergies    Intolerances      Standing Inpatient Medications    PRN Inpatient Medications      REVIEW OF SYSTEMS  --------------------------------------------------------------------------------  Gen: No weight changes, fatigue, fevers/chills, weakness  Skin: No rashes  Head/Eyes/Ears/Mouth: No headache; Normal hearing; Normal vision w/o blurriness; No sinus pain/discomfort, sore throat  Respiratory: No dyspnea, cough, wheezing, hemoptysis  CV: No chest pain, PND, orthopnea  GI: No abdominal pain, diarrhea, constipation, nausea, vomiting, melena, hematochezia  : No increased frequency, dysuria, hematuria, nocturia  MSK: No joint pain/swelling; no back pain; no edema  Neuro: No dizziness/lightheadedness, weakness, seizures, numbness, tingling  Heme: No easy bruising or bleeding  Endo: No heat/cold intolerance  Psych: anxious appearing; crying at bedside;    All other systems were reviewed and are negative, except as noted.    VITALS/PHYSICAL EXAM  --------------------------------------------------------------------------------  T(C): 37.2 (03-22-21 @ 11:15), Max: 38.7 (03-22-21 @ 09:05)  HR: 90 (03-22-21 @ 11:15) (80 - 158)  BP: 124/77 (03-22-21 @ 11:15) (98/71 - 135/77)  RR: 16 (03-22-21 @ 11:15) (16 - 18)  SpO2: 100% (03-22-21 @ 11:15) (92% - 100%)  Wt(kg): --  Height (cm): 167.6 (03-22-21 @ 08:33)  Weight (kg): 38.6 (03-22-21 @ 08:33)  BMI (kg/m2): 13.7 (03-22-21 @ 08:33)  BSA (m2): 1.39 (03-22-21 @ 08:33)      Physical Exam:  · CONSTITUTIONAL: cachectic with temporal wasting, awake and alert; AAO x 3  · ENMT: Airway patent, Nasal mucosa clear. Mouth with dry oral mucosa. Throat has no vesicles, no oropharyngeal exudates and uvula is midline. neck is supple, no nuchal rigidity  · EYES: Clear bilaterally, pupils equal, round and reactive to light.  · CARDIAC: - - -  · CARDIAC RHYTHM: regular  · CARDIAC RATE: TACHYCARDIC  · CARDIAC SOUNDS: S1-S2  · CARDIAC MURMUR: no murmur  · CARDIAC PEDAL EDEMA: absent  · RESPIRATORY: Breath sounds clear and equal bilaterally.  · GASTROINTESTINAL: Abdomen soft, non-tender, no guarding.  · MUSCULOSKELETAL: Spine appears normal, range of motion is not limited, no muscle or joint tenderness  · NEUROLOGICAL: AAO x 3  · SKIN: disseminated 1 cm dry, scaly erythematous patches  · PSYCHIATRIC: Alert and oriented to person, place, time/situation. normal mood and affect. no apparent risk to self or others.  · HEME LYMPH: No adenopathy or splenomegaly. No cervical or inguinal lymphadenopathy.      LABS/STUDIES  --------------------------------------------------------------------------------              17.0   17.20 >-----------<  391      [03-22-21 @ 09:12]              50.8     148  |  92  |  79.0  ----------------------------<  228      [03-22-21 @ 09:12]  4.2   |  25.0  |  5.99        Ca     8.8     [03-22-21 @ 09:12]      Mg     3.3     [03-22-21 @ 09:12]    TPro  9.1  /  Alb  5.2  /  TBili  0.6  /  DBili  x   /  AST  17  /  ALT  19  /  AlkPhos  82  [03-22-21 @ 09:12]    PT/INR: PT 13.6 , INR 1.18       [03-22-21 @ 09:12]  PTT: 33.5       [03-22-21 @ 09:12]    CK 76      [03-22-21 @ 09:12]    Creatinine Trend:  SCr 5.99 [03-22 @ 09:12]    Urinalysis - [03-22-21 @ 11:25]      Color Yellow / Appearance Clear / SG 1.025 / pH 5.0      Gluc Negative / Ketone Moderate  / Bili Negative / Urobili Negative       Blood Small / Protein 30 / Leuk Est Small / Nitrite Negative      RBC 0-2 / WBC 3-5 / Hyaline  / Gran  / Sq Epi  / Non Sq Epi Occasional / Bacteria Negative      TSH 0.45      [03-22-21 @ 09:12]    HIV Nonreact      [03-22-21 @ 09:12]

## 2021-03-22 NOTE — ED PROVIDER NOTE - CRITICAL CARE ATTENDING CONTRIBUTION TO CARE
Pt arrived markedly tachycardic, low grade fever, AMS. Called as code sepsis and required aggressive fluid resuscitation and immediate care.

## 2021-03-22 NOTE — CONSULT NOTE ADULT - ASSESSMENT
Impression:  36 yo female with polysubstance abuse, now presents with MARYCARMEN likely due to severe dehydration (polycythemia on blood work, contraction alkalosis), lactic acidosis, encephalopathy (which has resolved), and cocaine abuse.     Recommendations:  - would continue IV hydration  - renal imaging (renal US or CT abdomen) to rule out obstruction  - repeat chemistry and lactate this afternoon.  At the moment the patient is unwilling to remain in the hospital and would like to leave AMA.  She seems to understand the risks which I explained to her (worsening organ failure including renal failure requiring dialysis, irreversible injury and death)  and she appears to have full decision making capacity. 
1) ATN  2) Dehydration  3) Cocaine, heroin use  4) Lactic acidosis    Pt needs IVF; urine I/O monitoring  Would ideally like to repeat labs in 4 hours and decide on need for hemodialysis;  However patient is refusing all further workup and treatment; wishing to leave / sign out against medical advice  I explained risk of death given significant acidosis; renal failure; despite this explanation pt still wishing to leave.    Discussed with MICU PA and Dr Darío Medrano, and RN at bedside;

## 2021-03-22 NOTE — ED PROVIDER NOTE - OBJECTIVE STATEMENT
35 year old female with PMH heroin abuse (as per boyfriend, to his knowledge clean x 2 years) presents with AMS. Pt is lethargic and unable to provide any elements of the Hx. As per boyfriend, he noticed 3 days of worsening generalized weakness, fatigue, and poor PO intake. Then this morning, she tried to get out of bed, and fell to the floor. He stated that she had muscle spasm of her b/l hands. no generalized convulsions. 35 year old female with PMH heroin abuse presents with AMS. Pt is somnolent but arouses ot verbal stimuli. As per boyfriend, he noticed 3 days of worsening generalized weakness, fatigue, and poor PO intake. Then this morning, she tried to get out of bed, and fell to the floor. He stated that she had muscle spasm of her b/l hands. no generalized convulsions. Pt only stating that she is thirsty and offers no other complaints at this time.

## 2021-03-22 NOTE — ED PROVIDER NOTE - CPE EDP HEME LYMPH NORM
From: Hyacinth Ruelas  To: Navin Fowler MD  Sent: 6/20/2019 6:38 PM EDT  Subject: Test Results Question    Hi Dr. Theo Webber. It was very nice seeing you today. I have 2 quick questions:    1. I'm still unable to see my most recent lipid test results in [employer]. Is there anything that you can do to have them released into [employer] so I can view them? 2. Would it be possible to add a lipid panel test for my December re-check? I know you said we wouldn't check them for a year, but for peace of mind it would be helpful to see how they're doing at the 6 month guicho. If not, I understand, but would appreciate it if at all possible.     Thanks,  Hyacinth Ruelas normal...

## 2021-03-23 LAB
CULTURE RESULTS: SIGNIFICANT CHANGE UP
SPECIMEN SOURCE: SIGNIFICANT CHANGE UP

## 2021-03-27 LAB
CULTURE RESULTS: SIGNIFICANT CHANGE UP
CULTURE RESULTS: SIGNIFICANT CHANGE UP
SPECIMEN SOURCE: SIGNIFICANT CHANGE UP
SPECIMEN SOURCE: SIGNIFICANT CHANGE UP

## 2024-12-24 NOTE — ED ADULT NURSE REASSESSMENT NOTE - NS ED NURSE REASSESS COMMENT FT1
Home health  [facility]   [phone]     patient more alert at this time, pt states " I am dehydrated."